# Patient Record
Sex: FEMALE | Race: WHITE | Employment: OTHER | URBAN - METROPOLITAN AREA
[De-identification: names, ages, dates, MRNs, and addresses within clinical notes are randomized per-mention and may not be internally consistent; named-entity substitution may affect disease eponyms.]

---

## 2022-01-06 ENCOUNTER — HOSPITAL ENCOUNTER (OUTPATIENT)
Dept: PHYSICAL THERAPY | Age: 67
Discharge: HOME | End: 2022-01-06
Payer: MEDICARE

## 2022-01-06 PROCEDURE — 97112 NEUROMUSCULAR REEDUCATION: CPT | Performed by: PHYSICAL THERAPIST

## 2022-01-06 PROCEDURE — 97162 PT EVAL MOD COMPLEX 30 MIN: CPT | Performed by: PHYSICAL THERAPIST

## 2022-01-06 PROCEDURE — 97535 SELF CARE MNGMENT TRAINING: CPT | Performed by: PHYSICAL THERAPIST

## 2022-01-06 NOTE — PROGRESS NOTES
Physical Therapy at [facility],   a part of [facility]  [address] [place] [address] Kishor Gill  Phone: [phone]  Fax: [phone]    Plan of Care/Statement of Necessity for Physical Therapy Services  2-15    Patient name: Concepcion Brink  : 1955  Provider#: [id number]  Referral source: Clarissa Nieves MD      Medical/Treatment Diagnosis: Mixed incontinence [N39.46]     Prior Hospitalization: see medical history     Comorbidities: see chart  Prior Level of Function: see chart  Medications: Verified on Patient Summary List  Start of Care: 2022      Onset Date: 3 months   The Plan of Care and following information is based on the information from the initial evaluation. Assessment/ key information: Patient referred to pelvic PT for pelvic floor dysfunction. She presents with signs and symptoms associated with fecal and urge incontinence. She presents with strength deficits, overactivity, poor coordination and awareness of the levator ani. She will benefit from skilled PT to address these problems so that she can perform all ADLs at [facility]. Evaluation Complexity History MEDIUM  Complexity : 1-2 comorbidities / personal factors will impact the outcome/ POC ; Examination MEDIUM Complexity : 3 Standardized tests and measures addressing body structure, function, activity limitation and / or participation in recreation  ;Presentation MEDIUM Complexity : Evolving with changing characteristics  ; Clinical Decision Making MEDIUM Complexity : FOTO score of 26-74  Overall Complexity Rating: MEDIUM    Problem List: pain affecting function, decrease ROM, decrease strength, decrease ADL/ functional abilitiies, decrease activity tolerance and decrease flexibility/ joint mobility   Treatment Plan may include any combination of the following: Therapeutic exercise, Therapeutic activities, Neuromuscular re-education, Physical agent/modality, Manual therapy, Aquatic therapy, Patient education, Self Care training and Functional mobility training  Patient / Family readiness to learn indicated by: asking questions, trying to perform skills and interest  Persons(s) to be included in education: patient (P)  Barriers to Learning/Limitations: None  Patient Goal (s): to reduce FI  Patient Self Reported Health Status: good  Rehabilitation Potential: good    Short Term Goals: To be accomplished in 6 weeks:  Patient will be independent with a progressive home exercise program    Patient will demonstrate & utilized pelvic floor ms protection techniques   Patient will demonstrate compliance with squatty potty technique  Patient will demonstrate compliance with fiber  Patient will demonstrate improved PFM strength to 4/5 bilaterally in order to decrease FI symptoms      Long Term Goals: To be accomplished in 12 weeks:  Patient will demonstrate 5/5 PFM strength bilaterally in order to eliminate FI symptoms. Patient will demonstrate 10 second PFM holds at 5/5 in order to elminate FI symptoms. Patient will be able to maintain normal resting tone of 5mV for complete emptying of bowel   The patient will demonstrate independence with HEP   The patient will demonstrate Negative Hruska Adduction Drop Test    The patient will demonstrate greater than Grade II on Hruska Adduction Lift Test    Frequency / Duration: Patient to be seen 1-2 times per week for 12 weeks. Patient/ Caregiver education and instruction: self care, activity modification and exercises    [x]  Plan of care has been reviewed with PTA        Certification Period: 01/06/2022-04/06/2022  Wendi Heaton, PT 1/6/2022     ________________________________________________________________________    I certify that the above Therapy Services are being furnished while the patient is under my care. I agree with the treatment plan and certify that this therapy is necessary.     Physician's Signature:____________________ Date:____________Time: _________         Brittni Schmitz MD

## 2022-01-06 NOTE — PROGRESS NOTES
PT INITIAL EVALUATION NOTE - [facility] 2-15    Patient Name: Michael Led  Date:2022  : 1955  [x]  Patient  Verified  Payor: /    In time: 245  Out time: 345  Total Treatment Time (min): 60  Total Timed Codes (min): 30  1:1 Treatment Time ( only): 60   Visit #: 1     Treatment Area: Mixed incontinence [N39.46]    SUBJECTIVE  Pain Level (0-10 scale): 0  Any medication changes, allergies to medications, adverse drug reactions, diagnosis change, or new procedure performed?: [] No    [x] Yes (see summary sheet for update)  Subjective:    Patient reports for the last 3 months she has had 3 incidents of FI where she started having leakage without warning and unable to stop. She has a Hx of constipation where 3-4 days was her normal time between BM's. Eating a healthy diet of with plenty of fruits and vegetables. Drinking 5-6 glasses water per day. Reports some mild urinary frequency and feeling of incomplete emptying. No complaints of pelvic pain, mild LBP that is chronic. PLOF: yoga, swimming, line dancing, skiing. Mechanism of Injury: gradual/insidious  Previous Treatment/Compliance: n/a  PMHx/Surgical Hx: see chart  Work Hx: retired   Living Situation: lives with   Pt Goals: to reduce FI  Barriers: none  Motivation: yes  Substance use: none  Cognition: A & O x 4        OBJECTIVE/EXAMINATION    Patient instructed in the role of physical therapy in the evaluation and treatment of pelvic floor dysfunction, applicable treatment modalities discussed. Expectations for regular home exercise participation and expected visit frequency in to achieve the patient's goals were discussed. Patient instructed in pelvic floor anatomy and function including levator ani, puborectalis and superficial pelvic  musculature.      Patient was provided dietary information to improve stool quality including increasing soluble fiber intake (Bran Buds), probiotics (Activia yogurt) and increased water intake (6-8 glasses a day). Pt instructed in use of [place] stool chart to track progress. Patient provided information on bladder diary completion, will collect intake/measured output data for 72 hours and return for analysis. Patient educated in urinary urge suppression techniques including the KNACK, quick flicks, calmly walking rather than rushing to the toilet, nervous system down training. Patient educated in proper defecation posture and technique including use of Squatty Potty for better puborectalis ms relaxation. Pt to avoid straining to pass stool in order to decrease strain on pelvic floor. Postural Restoration [facility]) Evaluation    Posture: Other Observations:             Left   Right  Standing  Standing Reach Test (M)    6 inches     Functional Squat Test  (P)    Level 1       Sidelying  Adduction Drop Test (M)    +   +  Pelvic Mohave Drop Test (PADT) (P)  +   +  Passive Abduction Raise Test (PART) (P)  na   na  Passive IP ER Expansion Test (P)   limited  limited  Hruska Adduction Lift Test (M)   1   1  Hruska Abduction Lift Test (M)   na   na    Supine    Apical Expansion (R)     limited   limited*  Shoulder Flexion (R)       HG IR (R)      45 degrees  45 degrees  Subclavius Flexibility (R)    limited  limited  Elevated and Externally Rotated Ant.  Ribs (R) na   na  Horizontal Abduction (R)    0 degrees  0 degrees  Extension Drop Test (M)    na   na             15 min Neuromuscular Re-education:  [x]  See flow sheet :   Rationale: increase ROM, increase strength, improve coordination and increase proprioception  to improve the patients ability to perform ADLs    15 min Self Care/Home Management:  [x]  See flow sheet :   Rationale: increase ROM, increase strength, improve coordination and increase proprioception  to improve the patients ability to to reduce FI        With   [] TE   [] TA   [] Neuro   [] SC   [] other: Patient Education: [x] Review HEP [] Progressed/Changed HEP based on:   [] positioning   [] body mechanics   [] transfers   [] heat/ice application    [] other:      Other Objective/Functional Measures: [place] [address]           Pain Level (0-10 scale) post treatment: 0    ASSESSMENT/Changes in Function:     [x]  See Plan of [address] [place] [address], PT 1/6/2022

## 2022-01-11 ENCOUNTER — HOSPITAL ENCOUNTER (OUTPATIENT)
Dept: PHYSICAL THERAPY | Age: 67
Discharge: HOME | End: 2022-01-11
Payer: MEDICARE

## 2022-01-11 PROCEDURE — 97535 SELF CARE MNGMENT TRAINING: CPT | Performed by: PHYSICAL THERAPIST

## 2022-01-11 PROCEDURE — 97112 NEUROMUSCULAR REEDUCATION: CPT | Performed by: PHYSICAL THERAPIST

## 2022-01-11 NOTE — PROGRESS NOTES
PT DAILY TREATMENT NOTE - [facility] 2-15    Patient Name: Cruz Ohara  Date:2022  : 1955  [x]  Patient  Verified  [place]or: [place] MED[place] / Plan: [place] MEDICARE PART A & B / Product Type: Medicare /    In time:200  Out time:245  Total Treatment Time (min): 45  Total Timed Codes (min): 45  1:1 Treatment Time ( [address] only): 39   Visit #:  2    Treatment Area: Mixed incontinence [N39.46]    SUBJECTIVE  Pain Level (0-10 scale): 0  Any medication changes, allergies to medications, adverse drug reactions, diagnosis change, or new procedure performed?: [x] No    [] Yes (see summary sheet for update)  Subjective functional status/changes:   [] No changes reported  Patient reports that she has added a small dose of fiber and mg. It has not made a great change yet, her motility is very delayed. Feels like it gets stuck in her upper abdomen. OBJECTIVE      []redness  adverse reaction:          30 min Neuromuscular Re-education:  [x]  See flow sheet :   Rationale: increase ROM, increase strength, improve coordination and increase proprioception  to improve the patients ability to perform ADLs    15 min Self Care/Home Management:  -  See flow sheet :   Rationale: increase ROM, increase strength, improve coordination and increase proprioception  to improve the patients ability to perform ADLs              With   [] TE   [] TA   [] Neuro   [] SC   [] other: Patient Education: [x] Review HEP    [] Progressed/Changed HEP based on:   [] positioning   [] body mechanics   [] transfers   [] heat/ice application    [] other:      Other Objective/Functional Measures: PEC patterning, poor apical and lateral rib excursion with exercises. Will perform EMG assessment next visit.      Pain Level (0-10 scale) post treatment: 0    ASSESSMENT/Changes in Function:     Patient will continue to benefit from skilled PT services to modify and progress therapeutic interventions, address functional mobility deficits, address ROM deficits, address strength deficits, analyze and address soft tissue restrictions, analyze and cue movement patterns, analyze and modify body mechanics/ergonomics and assess and modify postural abnormalities to attain remaining goals.      []  See Plan of Care  []  See progress note/recertification  []  See Discharge Summary         Progress towards goals / Updated goals:  Demonstrates good potential to meet goals at this time    PLAN  []  Upgrade activities as tolerated     [x]  Continue plan of care  []  Update interventions per flow sheet       []  Discharge due to:_  []  Other:_      Clive Falcon, PT 1/11/2022

## 2022-01-18 ENCOUNTER — HOSPITAL ENCOUNTER (OUTPATIENT)
Dept: PHYSICAL THERAPY | Age: 67
Discharge: HOME | End: 2022-01-18
Payer: MEDICARE

## 2022-01-18 PROCEDURE — 97112 NEUROMUSCULAR REEDUCATION: CPT | Performed by: PHYSICAL THERAPIST

## 2022-01-18 PROCEDURE — 97535 SELF CARE MNGMENT TRAINING: CPT | Performed by: PHYSICAL THERAPIST

## 2022-01-18 NOTE — PROGRESS NOTES
PT DAILY TREATMENT NOTE - [facility] 2-15    Patient Name: Mecca Roy  Date:2022  : 1955  [x]  Patient  Verified  Payor: [place] MEDICARE / Plan: [place] MEDICARE PART A & B / Product Type: Medicare /    In time:950  Out time:1030  Total Treatment Time (min): 40  Total Timed Codes (min): 40  1:1 Treatment Time ([facility] only): 40   Visit #:  3    Treatment Area: Mixed incontinence [N39.46]    SUBJECTIVE  Pain Level (0-10 scale): 0  Any medication changes, allergies to medications, adverse drug reactions, diagnosis change, or new procedure performed?: [x] No    [] Yes (see summary sheet for update)  Subjective functional status/changes:   [] No changes reported  Patient reports that she started taking 1000mg of magnesium at night  (previously discussed maintaining her magnesium and increasing fiber), and these past 2 days she has seen increase BM output, urgency, and much softer stools. She is unable to stop herself from leaking when she has an urge, and she is having difficulty distinguishing between gas and stool. OBJECTIVE      []redness  adverse reaction:          30 min Neuromuscular Re-education:  [x]  See flow sheet :   Rationale: increase ROM, increase strength, improve coordination and increase proprioception  to improve the patients ability to perform ADLs    15 min Self Care/Home Management:  -  See flow sheet : Education of fiber/magnesium ratio, toileting schedule, abdominal massage for GI motility   Rationale: increase ROM, increase strength, improve coordination and increase proprioception  to improve the patients ability to perform ADLs              With   [] TE   [] TA   [] Neuro   [] SC   [] other: Patient Education: [x] Review HEP    [] Progressed/Changed HEP based on:   [] positioning   [] body mechanics   [] transfers   [] heat/ice application    [] other:      Other Objective/Functional Measures:   SEMG  Resting:3-5mV  Max work: 12mV for 1 sec.      Pain Level (0-10 scale) post treatment: 0    ASSESSMENT/Changes in Function:   Emphasis placed on importance of managing correct magnesium dosage with increase soluble fiber, as she was taking a high amount not recommended by MD or PT. Patient will continue to benefit from skilled PT services to modify and progress therapeutic interventions, address functional mobility deficits, address ROM deficits, address strength deficits, analyze and address soft tissue restrictions, analyze and cue movement patterns, analyze and modify body mechanics/ergonomics and assess and modify postural abnormalities to attain remaining goals. []  See Plan of Care  []  See progress note/recertification  []  See Discharge Summary         Progress towards goals / Updated goals:  Short Term Goals: To be accomplished in 6 weeks:  Patient will be independent with a progressive home exercise program    Patient will demonstrate & utilized pelvic floor ms protection techniques   Patient will demonstrate compliance with squatty potty technique  Patient will demonstrate compliance with fiber  Patient will demonstrate improved PFM strength to 4/5 bilaterally in order to decrease FI symptoms      Long Term Goals: To be accomplished in 12 weeks:  Patient will demonstrate 5/5 PFM strength bilaterally in order to eliminate FI symptoms.   Patient will demonstrate 10 second PFM holds at 5/5 in order to [address] symptoms.   Patient will be able to maintain normal resting tone of 5mV for complete emptying of bowel   The patient will demonstrate independence with HEP   The patient will demonstrate Negative Hruska Adduction Drop Test    The patient will demonstrate greater than Grade II on Hruska Adduction Lift Test    PLAN  []  Upgrade activities as tolerated     [x]  Continue plan of care  []  Update interventions per flow sheet       []  Discharge due to:_  []  Other:_      Corrinne Fells, PT 1/18/2022

## 2022-01-25 ENCOUNTER — APPOINTMENT (OUTPATIENT)
Dept: PHYSICAL THERAPY | Age: 67
End: 2022-01-25
Payer: MEDICARE

## 2022-02-15 ENCOUNTER — HOSPITAL ENCOUNTER (OUTPATIENT)
Dept: PHYSICAL THERAPY | Age: 67
Discharge: HOME | End: 2022-02-15
Payer: MEDICARE

## 2022-02-15 PROCEDURE — 97535 SELF CARE MNGMENT TRAINING: CPT | Performed by: PHYSICAL THERAPIST

## 2022-02-15 PROCEDURE — 97112 NEUROMUSCULAR REEDUCATION: CPT | Performed by: PHYSICAL THERAPIST

## 2022-02-15 NOTE — PROGRESS NOTES
PT DAILY TREATMENT NOTE - [facility] 2-15    Patient Name: Dionne Alberto  Date:2/15/2022  : 1955  [x]  Patient  Verified  Payor: [place] MEDICARE / Plan: [place] MEDICARE PART A & B / Product Type: Medicare /    In time:1030  Out time:1115  Total Treatment Time (min): 45  Total Timed Codes (min): 45  1:1 Treatment Time ( [address] only): 39   Visit #:  4    Treatment Area: Mixed incontinence [N39.46]    SUBJECTIVE  Pain Level (0-10 scale): 0  Any medication changes, allergies to medications, adverse drug reactions, diagnosis change, or new procedure performed?: [x] No    [] Yes (see summary sheet for update)  Subjective functional status/changes:   [] No changes reported  Patient reports that she has been sick and went on a ski trip over past month. Only had one incident of UI where she couldn't get her ski clothes off fast enough. BM have improved with magnesium and fiber balance. Not sure if she is doing the exercises correctly. OBJECTIVE      []redness  adverse reaction:          30 min Neuromuscular Re-education:  [x]  See flow sheet :   Rationale: increase ROM, increase strength, improve coordination and increase proprioception  to improve the patients ability to perform ADLs    15 min Self Care/Home Management:  -  See flow sheet : Education of fiber/magnesium ratio, toileting schedule, abdominal massage for GI motility--Reveiwed   Rationale: increase ROM, increase strength, improve coordination and increase proprioception  to improve the patients ability to perform ADLs              With   [] TE   [] TA   [] Neuro   [] SC   [] other: Patient Education: [x] Review HEP    [] Progressed/Changed HEP based on:   [] positioning   [] body mechanics   [] transfers   [] heat/ice application    [] other:      Other Objective/Functional Measures:   Requires verbal cues for correct performance of exercises. Progressed to sitting and standing    22  SEMG  Resting:3-5mV  Max work: 12mV for 1 sec.      Pain Level (0-10 scale) post treatment: 0    ASSESSMENT/Changes in Function:   Emphasis placed on importance of managing correct magnesium dosage with increase soluble fiber, as she was taking a high amount not recommended by MD or PT. Patient will continue to benefit from skilled PT services to modify and progress therapeutic interventions, address functional mobility deficits, address ROM deficits, address strength deficits, analyze and address soft tissue restrictions, analyze and cue movement patterns, analyze and modify body mechanics/ergonomics and assess and modify postural abnormalities to attain remaining goals. []  See Plan of Care  []  See progress note/recertification  []  See Discharge Summary         Progress towards goals / Updated goals: Making steady progress towards all ST and LT goals  Short Term Goals: To be accomplished in 6 weeks:  Patient will be independent with a progressive home exercise program    Patient will demonstrate & utilized pelvic floor ms protection techniques   Patient will demonstrate compliance with squatty potty technique  Patient will demonstrate compliance with fiber  Patient will demonstrate improved PFM strength to 4/5 bilaterally in order to decrease FI symptoms      Long Term Goals: To be accomplished in 12 weeks:  Patient will demonstrate 5/5 PFM strength bilaterally in order to eliminate FI symptoms.   Patient will demonstrate 10 second PFM holds at 5/5 in order to [address] symptoms.   Patient will be able to maintain normal resting tone of 5mV for complete emptying of bowel   The patient will demonstrate independence with HEP   The patient will demonstrate Negative Hruska Adduction Drop Test    The patient will demonstrate greater than Grade II on Hruska Adduction Lift Test    PLAN  []  Upgrade activities as tolerated     [x]  Continue plan of care  []  Update interventions per flow sheet       []  Discharge due to:_  []  Other:_      Karrie Felder, PT 2/15/2022

## 2022-02-15 NOTE — PROGRESS NOTES
Physical Therapy at [facility],   a part of  [facility]  [address] [facility] Kishor Gill  Phone: [phone]      Fax:  [phone]    Progress Note    Name: Michelle Murray   : 1955   MD: Brandt Morales MD       Treatment Diagnosis: Mixed incontinence [N39.46]  Start of Care: 2022    Visits from Start of Care: 4  Missed Visits: 0    Summary of Care:Patient is making steady progress towards all UI and FI goals as noted by decreased episodes and pad wearing. Will continue to benefit from skilled PT to achieve remaining. Progress towards goals / Updated goals: Making steady progress towards all ST and LT goals  Short Term Goals: To be accomplished in 6 weeks:  Patient will be independent with a progressive home exercise program    Patient will demonstrate & utilized pelvic floor ms protection techniques   Patient will demonstrate compliance with squatty potty technique  Patient will demonstrate compliance with fiber  Patient will demonstrate improved PFM strength to 4/5 bilaterally in order to decrease FI symptoms      Long Term Goals: To be accomplished in 12 weeks:  Patient will demonstrate 5/5 PFM strength bilaterally in order to eliminate FI symptoms.   Patient will demonstrate 10 second PFM holds at 5/5 in order to [address] symptoms.   Patient will be able to maintain normal resting tone of 5mV for complete emptying of bowel   The patient will demonstrate independence with HEP   The patient will demonstrate Negative Hruska Adduction Drop Test    The patient will demonstrate greater than Grade II on Hruska Adduction Lift Test        Wendi Heaton, PT 2/15/2022

## 2022-03-08 ENCOUNTER — APPOINTMENT (OUTPATIENT)
Dept: PHYSICAL THERAPY | Age: 67
End: 2022-03-08
Payer: MEDICARE

## 2022-03-22 ENCOUNTER — HOSPITAL ENCOUNTER (OUTPATIENT)
Dept: PHYSICAL THERAPY | Age: 67
Discharge: HOME | End: 2022-03-22
Payer: MEDICARE

## 2022-03-22 PROCEDURE — 97112 NEUROMUSCULAR REEDUCATION: CPT | Performed by: PHYSICAL THERAPIST

## 2022-03-22 PROCEDURE — 97535 SELF CARE MNGMENT TRAINING: CPT | Performed by: PHYSICAL THERAPIST

## 2022-03-22 NOTE — PROGRESS NOTES
PT DAILY TREATMENT NOTE - [facility] 2-15    Patient Name: Glenny Martinez  Date:3/22/2022  : 1955  [x]  Patient  Verified  [place]or: [place] MEDICARE / Plan: [place] MEDICARE PART A & B / Product Type: Medicare /    In time:1030  Out time:1110  Total Treatment Time (min): 40  Total Timed Codes (min): 40  1:1 Treatment Time ( only): 40   Visit #:  5    Treatment Area: Mixed incontinence [N39.46]    SUBJECTIVE  Pain Level (0-10 scale): 0  Any medication changes, allergies to medications, adverse drug reactions, diagnosis change, or new procedure performed?: [x] No    [] Yes (see summary sheet for update)  Subjective functional status/changes:   [] No changes reported  Patient reports that she went to [facility] a couple weeks ago, not as diligent with her exercises. Experienced mild leakage while on vacation, noted streaking at the end of the day. Would like to review her HEP. OBJECTIVE      []redness  adverse reaction:          30 min Neuromuscular Re-education:  [x]  See flow sheet :   Rationale: increase ROM, increase strength, improve coordination and increase proprioception  to improve the patients ability to perform ADLs    10 min Self Care/Home Management:  -  See flow sheet : pelvic brace and [employer]   Rationale: increase ROM, increase strength, improve coordination and increase proprioception  to improve the patients ability to perform ADLs              With   [] TE   [] TA   [] Neuro   [] SC   [] other: Patient Education: [x] Review HEP    [] Progressed/Changed HEP based on:   [] positioning   [] body mechanics   [] transfers   [] heat/ice application    [] other:      Other Objective/Functional Measures:   Patient required some manual and Verbal FB to perform exercises correctly    22 (recheck in 4 weeks)  SEMG  Resting:3-5mV  Max work: 12mV for 1 sec.      Pain Level (0-10 scale) post treatment: 0    ASSESSMENT/Changes in Function:   Emphasis placed on importance of managing correct magnesium dosage with increase soluble fiber, as she was taking a high amount not recommended by MD or PT. Patient will continue to benefit from skilled PT services to modify and progress therapeutic interventions, address functional mobility deficits, address ROM deficits, address strength deficits, analyze and address soft tissue restrictions, analyze and cue movement patterns, analyze and modify body mechanics/ergonomics and assess and modify postural abnormalities to attain remaining goals. []  See Plan of Care  []  See progress note/recertification  []  See Discharge Summary         Progress towards goals / Updated goals: Continues to make slow but steady progress,  Limited by compliance with HEP due to traveling. Will recheck sEMG next visit. Short Term Goals: To be accomplished in 6 weeks:  Patient will be independent with a progressive home exercise program    Patient will demonstrate & utilized pelvic floor ms protection techniques   Patient will demonstrate compliance with squatty potty technique  Patient will demonstrate compliance with fiber  Patient will demonstrate improved PFM strength to 4/5 bilaterally in order to decrease FI symptoms      Long Term Goals: To be accomplished in 12 weeks:  Patient will demonstrate 5/5 PFM strength bilaterally in order to eliminate FI symptoms.   Patient will demonstrate 10 second PFM holds at 5/5 in order to [address] symptoms.   Patient will be able to maintain normal resting tone of 5mV for complete emptying of bowel   The patient will demonstrate independence with HEP   The patient will demonstrate Negative Hruska Adduction Drop Test    The patient will demonstrate greater than Grade II on Hruska Adduction Lift Test    PLAN  []  Upgrade activities as tolerated     [x]  Continue plan of care  []  Update interventions per flow sheet       []  Discharge due to:_  []  Other:_      Gely Ugarte, PT 3/22/2022

## 2022-03-22 NOTE — PROGRESS NOTES
Physical Therapy at [facility],   a part of [address]  [place]  [facility], [address]  Phone: [phone]      Fax:  [phone]    Progress Note    Name: Yana Matthews   : 1955   MD: Deja Wallace MD       Treatment Diagnosis: Mixed incontinence [N39.46]  Start of Care: 2022    Visits from Start of Care: 5  Missed Visits: 2    Summary of Care:Patient is making steady but slow progress towards all goals, secondary to travel and illness. She will continue to benefit from skilled PT to progress her HEP and continue to address strength deficits so that she can achieve remaining PT goals    Assessment / Recommendations:   Progress towards goals / Updated goals: Continues to make slow but steady progress,  Limited by compliance with HEP due to traveling. Will recheck sEMG in next 4-6 weeks. Short Term Goals: To be accomplished in 6 weeks: 50% MET  Patient will be independent with a progressive home exercise program    Patient will demonstrate & utilized pelvic floor ms protection techniques   Patient will demonstrate compliance with squatty potty technique  Patient will demonstrate compliance with fiber  Patient will demonstrate improved PFM strength to 4/5 bilaterally in order to decrease FI symptoms      Long Term Goals: To be accomplished in 12 weeks:  Patient will demonstrate 5/5 PFM strength bilaterally in order to eliminate FI symptoms.   Patient will demonstrate 10 second PFM holds at 5/5 in order to [address] symptoms.   Patient will be able to maintain normal resting tone of 5mV for complete emptying of bowel   The patient will demonstrate independence with HEP   The patient will demonstrate Negative Hruska Adduction Drop Test    The patient will demonstrate greater than Grade II on Hruska Adduction Lift Test            Leny Coburn, PT 3/22/2022

## 2022-04-26 ENCOUNTER — HOSPITAL ENCOUNTER (OUTPATIENT)
Dept: PHYSICAL THERAPY | Age: 67
Discharge: HOME | End: 2022-04-26
Payer: MEDICARE

## 2022-04-26 PROCEDURE — 97535 SELF CARE MNGMENT TRAINING: CPT | Performed by: PHYSICAL THERAPIST

## 2022-04-26 PROCEDURE — 97112 NEUROMUSCULAR REEDUCATION: CPT | Performed by: PHYSICAL THERAPIST

## 2022-04-26 NOTE — PROGRESS NOTES
PT DAILY TREATMENT NOTE - [facility] 2-15    Patient Name: Jethro Faria  Date:2022  : 1955  [x]  Patient  Verified  Payor: VA MEDICARE / Plan: VA MEDICARE PART A & B / Product Type: Medicare /    In time:210  Out time:250  Total Treatment Time (min): 40  Total Timed Codes (min): 40  1:1 Treatment Time ( [address] only): 40   Visit #:  6    Treatment Area: Mixed incontinence [N39.46]    SUBJECTIVE  Pain Level (0-10 scale): 0  Any medication changes, allergies to medications, adverse drug reactions, diagnosis change, or new procedure performed?: [x] No    [] Yes (see summary sheet for update)  Subjective functional status/changes:   [] No changes reported  Concerned that her stool is now a Type 1 on [place] stool. She has reduced her water intake and doubled soluble fiber intake. Taking 800mg of Magnesium at night without improvement. Seeing improvement with urge suppression, ability to hold without leakage. OBJECTIVE      []redness - adverse reaction:          30 min Neuromuscular Re-education:  [x]  See flow sheet :   Rationale: increase ROM, increase strength, improve coordination and increase proprioception  to improve the patients ability to perform ADLs    10 min Self Care/Home Management:  -  See flow sheet : fiber, water management, toileting schedules and postures   Rationale: increase ROM, increase strength, improve coordination and increase proprioception  to improve the patients ability to perform ADLs              With   [] TE   [] TA   [] Neuro   [] SC   [] other: Patient Education: [x] Review HEP    [] Progressed/Changed HEP based on:   [] positioning   [] body mechanics   [] transfers   [] heat/ice application    [] other:      Other Objective/Functional Measures:     2022  SEMG  Resting:3-5mV  Max work: 20mV for 1 sec.  (improved)    [place] improved recruitment of PFM with breath coordination -no overflow effect    Pain Level (0-10 scale) post treatment: 0    ASSESSMENT/Changes in Function:   Emphasis placed on importance of managing correct magnesium dosage with increase soluble fiber, as she was taking a high amount not recommended by MD or PT. Patient will continue to benefit from skilled PT services to modify and progress therapeutic interventions, address functional mobility deficits, address ROM deficits, address strength deficits, analyze and address soft tissue restrictions, analyze and cue movement patterns, analyze and modify body mechanics/ergonomics and assess and modify postural abnormalities to attain remaining goals. []  See Plan of Care  []  See progress note/recertification  []  See Discharge Summary         Progress towards goals / Updated goals: 75% met, good candidate for D/C to HEP at this time per patient request  Short Term Goals: To be accomplished in 6 weeks:  Patient will be independent with a progressive home exercise program    Patient will demonstrate & utilized pelvic floor ms protection techniques   Patient will demonstrate compliance with squatty potty technique  Patient will demonstrate compliance with fiber  Patient will demonstrate improved PFM strength to 4/5 bilaterally in order to decrease FI symptoms      Long Term Goals: To be accomplished in 12 weeks:  Patient will demonstrate 5/5 PFM strength bilaterally in order to eliminate FI symptoms.   Patient will demonstrate 10 second PFM holds at 5/5 in order to [address] symptoms.   Patient will be able to maintain normal resting tone of 5mV for complete emptying of bowel   The patient will demonstrate independence with HEP   The patient will demonstrate Negative Hruska Adduction Drop Test    The patient will demonstrate greater than Grade II on Hruska Adduction Lift Test    PLAN  []  Upgrade activities as tolerated     [x]  Continue plan of care  []  Update interventions per flow sheet       []  Discharge due to:_  []  Other:_      Odin Stahl, PT 4/26/2022

## 2022-08-30 ENCOUNTER — OFFICE VISIT (OUTPATIENT)
Dept: OBGYN CLINIC | Age: 67
End: 2022-08-30
Payer: MEDICARE

## 2022-08-30 VITALS — SYSTOLIC BLOOD PRESSURE: 137 MMHG | DIASTOLIC BLOOD PRESSURE: 81 MMHG | WEIGHT: 161.6 LBS

## 2022-08-30 DIAGNOSIS — Z01.419 WELL WOMAN EXAM WITH ROUTINE GYNECOLOGICAL EXAM: Primary | ICD-10-CM

## 2022-08-30 PROCEDURE — G8432 DEP SCR NOT DOC, RNG: HCPCS | Performed by: OBSTETRICS & GYNECOLOGY

## 2022-08-30 PROCEDURE — 1090F PRES/ABSN URINE INCON ASSESS: CPT | Performed by: OBSTETRICS & GYNECOLOGY

## 2022-08-30 PROCEDURE — 3017F COLORECTAL CA SCREEN DOC REV: CPT | Performed by: OBSTETRICS & GYNECOLOGY

## 2022-08-30 PROCEDURE — 1101F PT FALLS ASSESS-DOCD LE1/YR: CPT | Performed by: OBSTETRICS & GYNECOLOGY

## 2022-08-30 PROCEDURE — G0101 CA SCREEN;PELVIC/BREAST EXAM: HCPCS | Performed by: OBSTETRICS & GYNECOLOGY

## 2022-08-30 PROCEDURE — G8421 BMI NOT CALCULATED: HCPCS | Performed by: OBSTETRICS & GYNECOLOGY

## 2022-08-30 RX ORDER — PROGESTERONE 100 MG/1
100 CAPSULE ORAL DAILY
COMMUNITY
Start: 2022-07-29

## 2022-08-30 RX ORDER — ESTRADIOL 2 MG/1
TABLET ORAL
COMMUNITY
Start: 2022-07-29

## 2022-08-30 RX ORDER — IBANDRONATE SODIUM 150 MG/1
TABLET, FILM COATED ORAL
COMMUNITY
Start: 2018-04-14

## 2022-08-30 RX ORDER — ESTRADIOL AND NORETHINDRONE ACETATE 1; .5 MG/1; MG/1
1 TABLET ORAL DAILY
Qty: 90 TABLET | Refills: 4 | Status: SHIPPED | OUTPATIENT
Start: 2022-08-30

## 2022-08-30 NOTE — PROGRESS NOTES
Annual exam ages 69+    Sherice Goodman is a No obstetric history on file. ,  78 y.o. female   No LMP recorded. (Menstrual status: Menopause). She presents for her annual checkup. She is having  no sex drive . Menstrual status:    Her periods are absent due to menopause. Contraception:    The current method of family planning is NA post menopause. Hormonal status:    She is not having vasomotor symptoms. The patient is not using any ERT. Sexual history:    She  has no history on file for sexual activity. Medical conditions:    Since her last annual GYN exam about  few years   ago, she has not the following changes in her health history: none. Pap and Mammogram History:    Her most recent Pap smear was normal obtained 3 year(s) ago. Per patient     The patient had a recent mammogram 11/24/22 which was negative for malignancy. Per patient    Breast Cancer History/Substance Abuse: negative    Osteoporosis History:    Family history does not include a first or second degree relative with osteopenia or osteoporosis. History reviewed. No pertinent past medical history. History reviewed. No pertinent surgical history. Current Outpatient Medications   Medication Sig Dispense Refill    estradioL (ESTRACE) 2 mg tablet TAKE 1 TABLET BY MOUTH EVERY DAY FOR 90 DAYS      ibandronate (BONIVA) 150 mg tablet 1 tablet      progesterone (PROMETRIUM) 100 mg capsule Take 100 mg by mouth daily. TESTOSTERONE IM by IntraMUSCular route. Allergies: [place] flavor     Tobacco History:  reports that she has never smoked. She has never used smokeless tobacco.  Alcohol Abuse:  has no history on file for alcohol use. Drug Abuse:  has no history on file for drug use. Family Medical/Cancer History: History reviewed. No pertinent family history.      Review of Systems - History obtained from the patient  Constitutional: negative for weight loss, fever, night sweats  HEENT: negative for hearing loss, earache, congestion, snoring, sorethroat  CV: negative for chest pain, palpitations, edema  Resp: negative for cough, shortness of breath, wheezing  GI: negative for change in bowel habits, abdominal pain, black or bloody stools  : negative for frequency, dysuria, hematuria, vaginal discharge  MSK: negative for back pain, joint pain, muscle pain  Breast: negative for breast lumps, nipple discharge, galactorrhea  Skin :negative for itching, rash, hives  Neuro: negative for dizziness, headache, confusion, weakness  Psych: negative for anxiety, depression, change in mood  Heme/lymph: negative for bleeding, bruising, pallor    Physical Exam    Visit Vitals  /81   Wt 161 lb 9.6 oz (73.3 kg)       Constitutional  Appearance: well-nourished, well developed, alert, in no acute distress    HENT  Head and Face: appears normal    Neck  Inspection/Palpation: normal appearance, no masses or tenderness  Lymph Nodes: no lymphadenopathy present  Thyroid: gland size normal, nontender, no nodules or masses present on palpation    Chest  Respiratory Effort: breathing unlabored    Breasts  Inspection of Breasts: breasts symmetrical, no skin changes, no discharge present, nipple appearance normal, no skin retraction present  Palpation of Breasts and Axillae: no masses present on palpation, no breast tenderness  Axillary Lymph Nodes: no lymphadenopathy present    Gastrointestinal  Abdominal Examination: abdomen non-tender to palpation, normal bowel sounds, no masses present  Liver and spleen: no hepatomegaly present, spleen not palpable  Hernias: no hernias identified    Genitourinary  External Genitalia: normal appearance for age, no discharge present, no tenderness present, no inflammatory lesions present, no masses present, atrophy present  Vagina: atrophic but otherwise normal vaginal vault without central or paravaginal defects, no discharge present, no inflammatory lesions present, no masses present  Bladder: non-tender to palpation  Urethra: appears normal  Cervix: normal   Uterus: normal size, shape and consistency  Adnexa: no adnexal tenderness present, no adnexal masses present  Perineum: perineum within normal limits, no evidence of trauma, no rashes or skin lesions present  Anus: anus within normal limits, no hemorrhoids present  Inguinal Lymph Nodes: no lymphadenopathy present    Skin  General Inspection: no rash, no lesions identified    Neurologic/Psychiatric  Mental Status:  Orientation: grossly oriented to person, place and time  Mood and Affect: mood normal, affect appropriate    Assessment:  Routine gynecologic examination  Her current medical status is satisfactory with no evidence of significant gynecologic issues. Decreased sensation with intercourse- offered referral to Sex therapist, pt declined, using testosterone cream that she reports is not helping. Discussed risks associated. Discussed r/b/a of hrt, pt desires to continue. Stenotic cervix- will get records, if normal pap then no longer needs to continue.     Plan:  Counseled re: diet, exercise, healthy lifestyle  Return for yearly wellness visits  Rec annual mammogram

## 2022-09-03 LAB
CYTOLOGIST CVX/VAG CYTO: NORMAL
CYTOLOGY CVX/VAG DOC CYTO: NORMAL
CYTOLOGY CVX/VAG DOC THIN PREP: NORMAL
CYTOLOGY HISTORY:: NORMAL
DX ICD CODE: NORMAL
LABCORP, 190119: NORMAL
Lab: NORMAL
OTHER STN SPEC: NORMAL
PATHOLOGIST CVX/VAG CYTO: NORMAL
STAT OF ADQ CVX/VAG CYTO-IMP: NORMAL

## 2022-09-06 ENCOUNTER — TELEPHONE (OUTPATIENT)
Dept: OBGYN CLINIC | Age: 67
End: 2022-09-06

## 2022-09-06 NOTE — TELEPHONE ENCOUNTER
Zeb Levi MD  You 4 days ago     FW  Please call her pharmacy and refill what they have on file for testosterone. I believe she is using testosterone cream 2%, apply a small amount topically, once daily. #3 month supply, refills 4. Janet Louis  to Zeb Levi MD        9:08 AM  I forgot to give you the pharmacy for the testosterone. [address] [place] [address]  [phone]  I Read your summary. I do not have lack of desire. Its lack of sensation.       Please confirm this is the correct dosage and grams and then I will call in    Thank you    Please amend=/sign

## 2022-09-07 NOTE — TELEPHONE ENCOUNTER
Prescription called in as per MD order to [address]     Patient was sent a my chart of prescription refill sent

## 2022-11-16 ENCOUNTER — TRANSCRIBE ORDER (OUTPATIENT)
Dept: GENERAL RADIOLOGY | Age: 67
End: 2022-11-16

## 2022-11-16 ENCOUNTER — HOSPITAL ENCOUNTER (OUTPATIENT)
Dept: GENERAL RADIOLOGY | Age: 67
Discharge: HOME | End: 2022-11-16
Payer: MEDICARE

## 2022-11-16 DIAGNOSIS — M79.671 RIGHT FOOT PAIN: ICD-10-CM

## 2022-11-16 DIAGNOSIS — M79.671 RIGHT FOOT PAIN: Primary | ICD-10-CM

## 2022-11-16 PROCEDURE — 73630 X-RAY EXAM OF FOOT: CPT

## 2022-11-18 NOTE — PROGRESS NOTES
Physical Therapy at [facility],   a part of  [facility]  [address] [facility], [address]  Phone: [phone]  Fax: [phone]    Discharge Summary  2-15    Patient name: Luis Alberto Herbert  : 1955  Provider#: [id number]  Referral source: Janeann Ahumada, MD      Medical/Treatment Diagnosis: Mixed incontinence [N39.46]     Prior Hospitalization: see medical history     Comorbidities: See Plan of Care  Prior Level of Function:See Plan of Care  Medications: Verified on Patient Summary List    Start of Care: 2022      Onset Date:3 months   Visits from Start of Care: 6     Missed Visits: 0  Reporting Period :  to 2022      ASSESSMENT/SUMMARY OF CARE: Making progress towards all goals.   Provided with HEP for continues Sx management          RECOMMENDATIONS:  [x]Discontinue therapy: [x]Patient has reached or is progressing toward set goals      []Patient is non-compliant or has abdicated      []Due to lack of appreciable progress towards set goals      []Other    Chase Owen, PT 2022

## 2023-03-30 ENCOUNTER — TELEPHONE (OUTPATIENT)
Dept: OBGYN CLINIC | Age: 68
End: 2023-03-30

## 2023-03-31 NOTE — TELEPHONE ENCOUNTER
Prescription called in as per MD order for testosterone propionate (TESTOSTERONE CREAM) [[id number]]     Order Details  Dose, Route, Frequency: As Directed   Dispense Quantity: 30 g Refills: 4          Sig: Testosterone Cream 2%  apply a small amount topically, once daily. Start Date: 03/31/23 End Date: --   Written Date: 03/31/23 Expiration Date: --   Original Order:  testosterone propionate (TESTOSTERONE CREAM) [[id number]]   Providers    Authorizing Provider:    Rachael Jenkins MD   [address] [place] [address]   Phone:  [phone]   Fax:  [phone]   [address] [place] [address] #:  [id number]   NPI:  [id number]         Pharmacy verbalized understanding.     Patient was sent a my chart message

## 2023-03-31 NOTE — TELEPHONE ENCOUNTER
75year old patient last seen in the office on 8/30/2022 for ae and is being seen on 9/6/2023 for next ae    Please amend/sign requested prescription      Ok to call in the refill  Please advise    Thank you          Indy Ch[facility]ell [facility] (supporting Marisa Sheets MD) 14 hours ago (5:28 PM)     DB  The pharmacy says the prescription ended 3/6/2023.        You  Roseanna Valle 15 hours ago (4:24 PM)     BS  Hello     Please check with the pharmacy as you should have refills from the prescription sent in September 2022

## 2023-03-31 NOTE — TELEPHONE ENCOUNTER
Two patient identifiers used      [facility] [address] calling for the refill of the pended medication ( the medication is a controlled substance and the prescription has  and needs renewed        ? Ok to call in till next ae due, please amend/sign so it can be called in    Please advise    Patient has next ae on 2023

## 2023-03-31 NOTE — TELEPHONE ENCOUNTER
She was given refills to last for a year, if she has already gone through all of the refills then she is likely using too much. Further, over 65 the benefits usually do not outweigh the risks. Also at her last appt she said she did not notice that it was helping anyway, so she should think about weaning off. I will refill for another year but she should try to wean herself off as I likely will not refill it again due to the risks.       (To wean off she can use every other day then every third day, etc)

## 2023-09-05 NOTE — PROGRESS NOTES
Nick Dickson is a 75 y.o. female returns for an annual exam     Chief Complaint   Patient presents with    Annual Exam       No LMP recorded. Patient is postmenopausal.  Her periods are absent. Problems: no problems  Birth Control: post menopausal status. Last Pap: normal NO ECC obtained 1 year(s) ago. She does not have a history of ROMI 2, 3 or cervical cancer. Last Mammogram: had a recent mammogram 2/2023 which was negative for malignancy. Last Bone Density: normal obtained 1 year(s) ago. Last colonoscopy: normal obtained 3 year(s) ago. 1. Have you been to the ER, urgent care clinic, or hospitalized since your last visit? No    2. Have you seen or consulted any other health care providers outside of the [address] [place] [address] Avenue since your last visit? No    Examination chaperoned by Gucci Kovacs CMA.

## 2023-09-06 ENCOUNTER — OFFICE VISIT (OUTPATIENT)
Age: 68
End: 2023-09-06
Payer: MEDICARE

## 2023-09-06 VITALS — DIASTOLIC BLOOD PRESSURE: 77 MMHG | SYSTOLIC BLOOD PRESSURE: 151 MMHG | WEIGHT: 161 LBS

## 2023-09-06 DIAGNOSIS — Z01.419 ENCOUNTER FOR GYNECOLOGICAL EXAMINATION (GENERAL) (ROUTINE) WITHOUT ABNORMAL FINDINGS: Primary | ICD-10-CM

## 2023-09-06 PROCEDURE — G0101 CA SCREEN;PELVIC/BREAST EXAM: HCPCS | Performed by: OBSTETRICS & GYNECOLOGY

## 2023-09-06 RX ORDER — ESTRADIOL AND NORETHINDRONE ACETATE 1; .5 MG/1; MG/1
1 TABLET ORAL DAILY
Qty: 90 TABLET | Refills: 4 | Status: SHIPPED | OUTPATIENT
Start: 2023-09-06

## 2023-09-06 NOTE — PROGRESS NOTES
Annual exam    Sarwat Mancuso is a No obstetric history on file. ,  75 y.o. female   No LMP recorded. Patient is postmenopausal.    She presents for her annual checkup. She has no significant complaints     Hormonal status:  She reports no perimenstrual type symptoms. She is not having vasomotor symptoms. The patient is not using any ERT. Sexual history:    She  has no history on file for sexual activity. Per Nursing Note:   No LMP recorded. Patient is postmenopausal.  Her periods are absent. Problems: no problems  Birth Control: post menopausal status. Last Pap: normal NO ECC obtained 1 year(s) ago. She does not have a history of ROMI 2, 3 or cervical cancer. Last Mammogram: had a recent mammogram 2/2023 which was negative for malignancy. Last Bone Density: normal obtained 1 year(s) ago. Last colonoscopy: normal obtained 3 year(s) ago. No past medical history on file. No past surgical history on file. Current Outpatient Medications   Medication Sig Dispense Refill    TESTOSTERONE NA Place vaginally Pt states she uses a compounding testosterone cream      estradiol-norethindrone (ACTIVELLA) 1-0.5 MG per tablet Take 1 tablet by mouth daily      ibandronate (BONIVA) 150 MG tablet 1 tablet      estradiol (ESTRACE) 2 MG tablet TAKE 1 TABLET BY MOUTH EVERY DAY FOR 90 DAYS (Patient not taking: Reported on 9/6/2023)      progesterone (PROMETRIUM) 100 MG CAPS capsule Take 100 mg by mouth daily (Patient not taking: Reported on 9/6/2023)       No current facility-administered medications for this visit. Allergies: [place] flavor     Tobacco History:  reports that she has never smoked. She has never used smokeless tobacco.  Alcohol Abuse:  has no history on file for alcohol use. Drug Abuse:  has no history on file for drug use.     Family Medical/Cancer History:   Family History   Problem Relation Age of Onset    Breast Cancer Maternal Aunt         Review of Systems - History obtained from the

## 2023-09-25 RX ORDER — ESTRADIOL AND NORETHINDRONE ACETATE 1; .5 MG/1; MG/1
1 TABLET ORAL DAILY
Qty: 84 TABLET | OUTPATIENT
Start: 2023-09-25

## 2023-10-17 RX ORDER — ESTRADIOL AND NORETHINDRONE ACETATE 1; .5 MG/1; MG/1
1 TABLET ORAL DAILY
Qty: 84 TABLET | OUTPATIENT
Start: 2023-10-17

## 2023-10-20 ENCOUNTER — TELEPHONE (OUTPATIENT)
Age: 68
End: 2023-10-20

## 2023-10-20 RX ORDER — ESTRADIOL AND NORETHINDRONE ACETATE 1; .5 MG/1; MG/1
1 TABLET ORAL DAILY
Qty: 90 TABLET | Refills: 4 | Status: SHIPPED | OUTPATIENT
Start: 2023-10-20

## 2023-10-20 NOTE — TELEPHONE ENCOUNTER
Two patient identifiers used    70year old patient last seen in the office on 9/6/2023 for ae    Patient calling to say that she has not gotten her refill of her  estradiol-norethindrone (ACTIVELLA) 1-0.5 MG per tablet [[id number]]     Order Details  Dose: 1 tablet Route: Oral Frequency: DAILY   Dispense Quantity: 90 tablet         This nurse advised that the prescription has been sent to [facility]      Prescription resent as per MD verbal order to patient requested pharmacy    Patient verbalized understanding.

## 2024-01-03 ENCOUNTER — TELEPHONE (OUTPATIENT)
Age: 69
End: 2024-01-03

## 2024-01-03 DIAGNOSIS — N95.1 POST MENOPAUSAL SYNDROME: Primary | ICD-10-CM

## 2024-01-03 NOTE — TELEPHONE ENCOUNTER
Pt calling last seen for AE on 9/6/23 requesting refills for her testosterone cream she applies to her arm be sent to [facility] pharmacy. Please advise. Pt is aware you are out of the office.

## 2024-01-04 NOTE — TELEPHONE ENCOUNTER
Pt called and requested for refill of testosterone Request was sent to  yesterday and pt notified that  is out of the office this week. Pt stated that  next week  will be too late because she is traveling on Sunday.  Please review.

## 2024-01-04 NOTE — TELEPHONE ENCOUNTER
[facility] called, PT name and  verified. Pharmacist reviewed the refill she had before, verbally reviewed MD message.  Ann Amezquita MD   to Narciso Baptiste, CMA       24  3:05 PM  Ok to refill  Testosterone 2% cream apply a small amount to skin once daily.  30 grams refills 5.     Pharmacist clarifies and reads back order, SHARITA # given, and states they will get that ordered for PT.

## 2024-01-04 NOTE — TELEPHONE ENCOUNTER
PT call returned name and      Relayed rx was called into pharmacy per MD order, and relayed MD message:  Ann Amezquita MD   to Narciso Baptiste, NIKKO       24  3:05 PM  Ok to refill  Testosterone 2% cream apply a small amount to skin once daily.  30 grams refills 5.      Please remind her that, as we have discussed at previous visits, I recommend she stop the testosterone due to the risks especially over 66yo.        PT states she hears the recommendation, but she is not ready to stop the medication.

## 2024-05-20 SDOH — HEALTH STABILITY: PHYSICAL HEALTH: ON AVERAGE, HOW MANY DAYS PER WEEK DO YOU ENGAGE IN MODERATE TO STRENUOUS EXERCISE (LIKE A BRISK WALK)?: 5 DAYS

## 2024-05-20 SDOH — HEALTH STABILITY: PHYSICAL HEALTH: ON AVERAGE, HOW MANY MINUTES DO YOU ENGAGE IN EXERCISE AT THIS LEVEL?: 60 MIN

## 2024-05-23 ENCOUNTER — OFFICE VISIT (OUTPATIENT)
Age: 69
End: 2024-05-23

## 2024-05-23 VITALS — WEIGHT: 154 LBS | HEIGHT: 63 IN | BODY MASS INDEX: 27.29 KG/M2

## 2024-05-23 DIAGNOSIS — G89.29 CHRONIC PAIN OF LEFT KNEE: Primary | ICD-10-CM

## 2024-05-23 DIAGNOSIS — M17.12 PRIMARY OSTEOARTHRITIS OF LEFT KNEE: ICD-10-CM

## 2024-05-23 DIAGNOSIS — M25.562 CHRONIC PAIN OF LEFT KNEE: Primary | ICD-10-CM

## 2024-08-19 ENCOUNTER — HOSPITAL ENCOUNTER (OUTPATIENT)
Facility: HOSPITAL | Age: 69
Discharge: HOME | End: 2024-08-22
Payer: MEDICARE

## 2024-08-19 DIAGNOSIS — M17.12 PRIMARY OSTEOARTHRITIS OF LEFT KNEE: ICD-10-CM

## 2024-08-19 DIAGNOSIS — G89.29 CHRONIC PAIN OF LEFT KNEE: ICD-10-CM

## 2024-08-19 DIAGNOSIS — M25.562 CHRONIC PAIN OF LEFT KNEE: ICD-10-CM

## 2024-08-19 LAB
EKG ATRIAL RATE: 74 BPM
EKG DIAGNOSIS: NORMAL
EKG P AXIS: 49 DEGREES
EKG P-R INTERVAL: 132 MS
EKG Q-T INTERVAL: 382 MS
EKG QRS DURATION: 80 MS
EKG QTC CALCULATION (BAZETT): 424 MS
EKG R AXIS: -8 DEGREES
EKG T AXIS: 63 DEGREES
EKG VENTRICULAR RATE: 74 BPM

## 2024-08-19 PROCEDURE — 93010 ELECTROCARDIOGRAM REPORT: CPT | Performed by: SPECIALIST

## 2024-08-19 PROCEDURE — 93005 ELECTROCARDIOGRAM TRACING: CPT

## 2024-08-19 PROCEDURE — 71046 X-RAY EXAM CHEST 2 VIEWS: CPT

## 2024-08-20 DIAGNOSIS — G89.29 CHRONIC PAIN OF LEFT KNEE: ICD-10-CM

## 2024-08-20 DIAGNOSIS — M25.562 CHRONIC PAIN OF LEFT KNEE: ICD-10-CM

## 2024-08-20 DIAGNOSIS — M17.12 PRIMARY OSTEOARTHRITIS OF LEFT KNEE: ICD-10-CM

## 2024-08-20 LAB
ANION GAP SERPL CALC-SCNC: 4 MMOL/L (ref 5–15)
BUN SERPL-MCNC: 18 MG/DL (ref 6–20)
BUN/CREAT SERPL: 17 (ref 12–20)
CALCIUM SERPL-MCNC: 9.2 MG/DL (ref 8.5–10.1)
CHLORIDE SERPL-SCNC: 106 MMOL/L (ref 97–108)
CO2 SERPL-SCNC: 28 MMOL/L (ref 21–32)
CREAT SERPL-MCNC: 1.07 MG/DL (ref 0.55–1.02)
ERYTHROCYTE [DISTWIDTH] IN BLOOD BY AUTOMATED COUNT: 12.5 % (ref 11.5–14.5)
GLUCOSE SERPL-MCNC: 97 MG/DL (ref 65–100)
HCT VFR BLD AUTO: 43.3 % (ref 35–47)
HGB BLD-MCNC: 14 G/DL (ref 11.5–16)
MCH RBC QN AUTO: 31 PG (ref 26–34)
MCHC RBC AUTO-ENTMCNC: 32.3 G/DL (ref 30–36.5)
MCV RBC AUTO: 95.8 FL (ref 80–99)
NRBC # BLD: 0 K/UL (ref 0–0.01)
NRBC BLD-RTO: 0 PER 100 WBC
PLATELET # BLD AUTO: 292 K/UL (ref 150–400)
PMV BLD AUTO: 10.1 FL (ref 8.9–12.9)
POTASSIUM SERPL-SCNC: 4.3 MMOL/L (ref 3.5–5.1)
RBC # BLD AUTO: 4.52 M/UL (ref 3.8–5.2)
SODIUM SERPL-SCNC: 138 MMOL/L (ref 136–145)
WBC # BLD AUTO: 8 K/UL (ref 3.6–11)

## 2024-09-03 DIAGNOSIS — M25.562 CHRONIC PAIN OF LEFT KNEE: ICD-10-CM

## 2024-09-03 DIAGNOSIS — G89.29 CHRONIC PAIN OF LEFT KNEE: ICD-10-CM

## 2024-09-03 DIAGNOSIS — M17.12 PRIMARY OSTEOARTHRITIS OF LEFT KNEE: ICD-10-CM

## 2024-09-06 ENCOUNTER — TELEPHONE (OUTPATIENT)
Age: 69
End: 2024-09-06

## 2024-09-06 NOTE — TELEPHONE ENCOUNTER
PT name and  verified    67 yo last ov/ae 23, next ae 9/10/24    [facility] calling stating they e-scribed and faxed a Rx request for Rx EC-RX Testosterone 0.2 % CREAM.  Relayed to pharmacist did not see a e-scribe notification and that PT is due for her ae on 9/10 and MD would refill at her ae.  Pharmacist verbalizes understanding and states he will resend as e-scribe and inform the PT that it may be refilled at her ae.

## 2024-09-11 ENCOUNTER — OFFICE VISIT (OUTPATIENT)
Age: 69
End: 2024-09-11

## 2024-09-11 VITALS
HEART RATE: 86 BPM | DIASTOLIC BLOOD PRESSURE: 81 MMHG | BODY MASS INDEX: 28.34 KG/M2 | WEIGHT: 160 LBS | SYSTOLIC BLOOD PRESSURE: 170 MMHG

## 2024-09-11 DIAGNOSIS — N95.1 POST MENOPAUSAL SYNDROME: ICD-10-CM

## 2024-09-11 RX ORDER — ESTRADIOL AND NORETHINDRONE ACETATE 1; .5 MG/1; MG/1
1 TABLET ORAL DAILY
Qty: 90 TABLET | Refills: 4 | Status: SHIPPED | OUTPATIENT
Start: 2024-09-11

## 2024-09-12 ENCOUNTER — TELEPHONE (OUTPATIENT)
Age: 69
End: 2024-09-12

## 2024-09-25 DIAGNOSIS — Z96.652 STATUS POST TOTAL LEFT KNEE REPLACEMENT: Primary | ICD-10-CM

## 2024-10-03 ENCOUNTER — OFFICE VISIT (OUTPATIENT)
Age: 69
End: 2024-10-03
Payer: MEDICARE

## 2024-10-03 DIAGNOSIS — E78.00 HIGH CHOLESTEROL: ICD-10-CM

## 2024-10-03 DIAGNOSIS — M17.12 PRIMARY OSTEOARTHRITIS OF LEFT KNEE: Primary | ICD-10-CM

## 2024-10-03 PROCEDURE — 99214 OFFICE O/P EST MOD 30 MIN: CPT | Performed by: ORTHOPAEDIC SURGERY

## 2024-10-03 PROCEDURE — G8484 FLU IMMUNIZE NO ADMIN: HCPCS | Performed by: ORTHOPAEDIC SURGERY

## 2024-10-03 PROCEDURE — 1123F ACP DISCUSS/DSCN MKR DOCD: CPT | Performed by: ORTHOPAEDIC SURGERY

## 2024-10-03 PROCEDURE — G8419 CALC BMI OUT NRM PARAM NOF/U: HCPCS | Performed by: ORTHOPAEDIC SURGERY

## 2024-10-03 PROCEDURE — G8427 DOCREV CUR MEDS BY ELIG CLIN: HCPCS | Performed by: ORTHOPAEDIC SURGERY

## 2024-10-03 PROCEDURE — 1036F TOBACCO NON-USER: CPT | Performed by: ORTHOPAEDIC SURGERY

## 2024-10-03 PROCEDURE — G8400 PT W/DXA NO RESULTS DOC: HCPCS | Performed by: ORTHOPAEDIC SURGERY

## 2024-10-03 PROCEDURE — 1090F PRES/ABSN URINE INCON ASSESS: CPT | Performed by: ORTHOPAEDIC SURGERY

## 2024-10-03 PROCEDURE — 3017F COLORECTAL CA SCREEN DOC REV: CPT | Performed by: ORTHOPAEDIC SURGERY

## 2024-10-03 RX ORDER — CEPHALEXIN 500 MG/1
500 CAPSULE ORAL EVERY 8 HOURS
Qty: 21 CAPSULE | Refills: 0 | Status: SHIPPED | OUTPATIENT
Start: 2024-10-03 | End: 2024-10-10

## 2024-10-03 RX ORDER — ASPIRIN 325 MG
325 TABLET, DELAYED RELEASE (ENTERIC COATED) ORAL 2 TIMES DAILY
Qty: 60 TABLET | Refills: 0 | Status: SHIPPED | OUTPATIENT
Start: 2024-10-03 | End: 2024-11-02

## 2024-10-03 RX ORDER — ONDANSETRON 8 MG/1
8 TABLET, ORALLY DISINTEGRATING ORAL EVERY 8 HOURS PRN
Qty: 20 TABLET | Refills: 0 | Status: SHIPPED | OUTPATIENT
Start: 2024-10-03

## 2024-10-03 RX ORDER — HYDROMORPHONE HYDROCHLORIDE 2 MG/1
2 TABLET ORAL
Qty: 30 TABLET | Refills: 0 | Status: SHIPPED | OUTPATIENT
Start: 2024-10-03 | End: 2024-10-11

## 2024-10-03 NOTE — PROGRESS NOTES
Name: Kaycee Richards    : 1955     [facility]OP[facility]ICS AND [facility]ORTS MEDICINE  [address]  [facility] [address]  Dept: [phone]  Dept Fax: [phone]     Chief Complaint   Patient presents with    Pre-op Exam    Knee Pain        LMP 2004      Allergies   Allergen Reactions    [place] Flavor Rash        Current Outpatient Medications   Medication Sig Dispense Refill    HYDROmorphone (DILAUDID) 2 MG tablet Take 1 tablet by mouth every 4-6 hours as needed for Pain for up to 8 days. DO NOT START PAIN MEDICATION UNTIL AFTER SURGERY! Max Daily Amount: 12 mg 30 tablet 0    ondansetron (ZOFRAN-ODT) 8 MG TBDP disintegrating tablet Take 1 tablet by mouth every 8 hours as needed for Nausea or Vomiting 20 tablet 0    aspirin 325 MG EC tablet Take 1 tablet by mouth in the morning and at bedtime DO NOT START TAKING UNTIL AFTER SURGERY!!  enteric coated aspirin - ONLY 60 tablet 0    cephALEXin (KEFLEX) 500 MG capsule Take 1 capsule by mouth every 8 (eight) hours for 7 days Do not start medication until after surgery 21 capsule 0    estradiol-norethindrone (ACTIVELLA) 1-0.5 MG per tablet Take 1 tablet by mouth daily 90 tablet 4    EC-RX Testosterone 0.2 % CREA Place 1 g onto the skin daily for 30 days. Max Daily Amount: 1 g 30 g 5     No current facility-administered medications for this visit.       There is no problem list on file for this patient.     Family History   Problem Relation Age of Onset    Breast Cancer Maternal Aunt     Breast Cancer Paternal Aunt     Diabetes Brother         Id.        Past Surgical History:   Procedure Laterality Date    HAND SURGERY  2010    KNEE ARTHROSCOPY  1982    KNEE SURGERY        Past Medical History:   Diagnosis Date    Baker's cyst     Bursitis     Osteoarthritis     Osteoporosis         I have reviewed and agree with PFSH and ROS and intake form in chart and the record furthermore I

## 2024-10-14 ENCOUNTER — HOSPITAL ENCOUNTER (OUTPATIENT)
Facility: HOSPITAL | Age: 69
Setting detail: RECURRING SERIES
Discharge: HOME | End: 2024-10-17
Payer: MEDICARE

## 2024-10-14 ENCOUNTER — TELEPHONE (OUTPATIENT)
Age: 69
End: 2024-10-14

## 2024-10-14 DIAGNOSIS — M25.562 LEFT KNEE PAIN, UNSPECIFIED CHRONICITY: ICD-10-CM

## 2024-10-14 DIAGNOSIS — Z96.652 STATUS POST TOTAL KNEE REPLACEMENT, LEFT: Primary | ICD-10-CM

## 2024-10-14 PROCEDURE — 97162 PT EVAL MOD COMPLEX 30 MIN: CPT

## 2024-10-14 PROCEDURE — 97535 SELF CARE MNGMENT TRAINING: CPT

## 2024-10-14 PROCEDURE — 97016 VASOPNEUMATIC DEVICE THERAPY: CPT

## 2024-10-14 PROCEDURE — 97110 THERAPEUTIC EXERCISES: CPT

## 2024-10-14 RX ORDER — OXYCODONE AND ACETAMINOPHEN 5; 325 MG/1; MG/1
1 TABLET ORAL
Qty: 30 TABLET | Refills: 0 | Status: SHIPPED | OUTPATIENT
Start: 2024-10-14 | End: 2024-10-22

## 2024-10-14 NOTE — THERAPY EVALUATION
[facility] at [place],   a part of [address], [address]  [place], [place] [address]  Phone: [phone]  Fax: [phone]       PHYSICAL THERAPY - MEDICARE EVALUATION/PLAN OF CARE NOTE (updated 3/23)      Date: 10/14/2024          Patient Name:  Kaycee Richards :  1955   Medical   Diagnosis:  Status post total left knee replacement [Z96.652] Treatment Diagnosis:  M25.562  LEFT KNEE PAIN and M79.652  Pain in left thigh M62.81  GENERAL MUSCLE WEAKNESS and R26.89   Abnormalities of gait and mobility    Referral Source:  Manuel Gregorio MD Provider #:  [id number]                Insurance: Payor: MEDICARE / Plan: MEDICARE PART A AND B / Product Type: *No Product type* /      Patient  verified yes     Visit #   Current  / Total 1 24   Time   In / Out 1:00p 2:15p   Total Treatment Time 75   Total Timed Codes 30   1:1 Treatment Time 30    [facility] Totals Reminder:  bill using total billable   min of TIMED therapeutic procedures and modalities.   8-22 min = 1 unit; 23-37 min = 2 units; 38-52 min = 3 units;  53-67 min = 4 units; 68-82 min = 5 units           SUBJECTIVE  Pain Level (0-10 scale): Today: 4 Lowest: 4 Worst: 8   []constant []intermittent [x]improving []worsening []no change since onset    Any medication changes, allergies to medications, adverse drug reactions, diagnosis change, or new procedure performed?: [x] No    [] Yes (see summary sheet for update)  Medications: Verified on Patient Summary List    Subjective functional status/changes:     Mrs. Richards is a 69 year old female s/p left TKA performed on 10/9/24 after chronic knee pain. Pt denies distal numbness,tingling, calf pain, or SOB    Start of Care: 10/14/2024  Onset Date: 10/9/24  Current symptoms/Complaints: L knee pain  Mechanism of Injury: Post-op 10/9/24  PLOF: Ind with ADLs, hiking, skiing  Limitations to PLOF/Activity or Recreational Limitations: Post-op pain and

## 2024-10-14 NOTE — TELEPHONE ENCOUNTER
Patient is requesting Percocet as a step down from Dilaudid.    LTKA 10/9/24    Dilaudid called in on 10/3/24, she is down to last tablet.    [facility] [id number] - [place], [place] - [address] [phone] - F [phone]

## 2024-10-16 ENCOUNTER — TELEMEDICINE (OUTPATIENT)
Age: 69
End: 2024-10-16

## 2024-10-16 DIAGNOSIS — M25.562 LEFT KNEE PAIN, UNSPECIFIED CHRONICITY: ICD-10-CM

## 2024-10-16 DIAGNOSIS — Z96.652 STATUS POST TOTAL KNEE REPLACEMENT, LEFT: Primary | ICD-10-CM

## 2024-10-16 RX ORDER — MELOXICAM 15 MG/1
15 TABLET ORAL DAILY
Qty: 30 TABLET | Refills: 0 | Status: SHIPPED | OUTPATIENT
Start: 2024-10-16

## 2024-10-16 NOTE — PATIENT INSTRUCTIONS
swelling, or shortness of breath, please call our office immediately or go to the nearest emergency room.    If you notice any significant increase in swelling of the knee or leg, redness around the incision site, drainage, pus, or any oozing fluid from the incision please notify our office immediately.      If you develop a fever greater than 101.5 or have any significant trauma or falls, please call and notify our office.  A low-grade temperature below 101 can be normal for the first few weeks.     Bruising & pain around your thigh, leg, & foot are normal for up to 6-8 weeks. Some localized swelling and minor aches to the knee may linger for upwards of 12 months post-op.     You may experience a clicking or clunking noise in your knee, this is normal you now have an artificial implant in place that can cause these noises.     Moving forward if you have any type of DENTAL WORK/CLEANINGS or any invasive procedures that involve a risk of bleeding we recommend that you pre medicate one hour prior to these procedures with antibiotics. Please let the performing provider know that you have a total knee implant in place so they may pre medicate you accordingly. Please contact our office at least 72 hours prior to needing these medications if they do not. Please do not resume any routine dental cleanings or other invasive procedures until you are at least 6 weeks out from your surgery.     At any time if you feel like you have stopped progressing in therapy or your knee feels worse, please call our office for an appointment to be seen.

## 2024-10-16 NOTE — PROGRESS NOTES
Kaycee Richards (:  1955) is a Established patient, evaluated via audio/visual telemedicine on 10/16/2024    [facility] ORTHOPAEDICS AND SPORTS MEDICINE  [address], [address]  [facility] [address]  Dept: [phone]  Dept Fax: [phone]   Chief Complaint   Patient presents with    Post-Op Check    Knee Pain     Patient-Reported Vitals  No data recorded   Allergies   Allergen Reactions    [place] Flavor Rash     Current Outpatient Medications   Medication Sig Dispense Refill    meloxicam (MOBIC) 15 MG tablet Take 1 tablet by mouth daily 30 tablet 0    oxyCODONE-acetaminophen (PERCOCET) 5-325 MG per tablet Take 1 tablet by mouth every 4-6 hours as needed for Pain for up to 8 days. Max Daily Amount: 6 tablets 30 tablet 0    ondansetron (ZOFRAN-ODT) 8 MG TBDP disintegrating tablet Take 1 tablet by mouth every 8 hours as needed for Nausea or Vomiting 20 tablet 0    aspirin 325 MG EC tablet Take 1 tablet by mouth in the morning and at bedtime DO NOT START TAKING UNTIL AFTER SURGERY!!  enteric coated aspirin - ONLY 60 tablet 0    estradiol-norethindrone (ACTIVELLA) 1-0.5 MG per tablet Take 1 tablet by mouth daily 90 tablet 4    EC-RX Testosterone 0.2 % CREA Place 1 g onto the skin daily for 30 days. Max Daily Amount: 1 g 30 g 5     No current facility-administered medications for this visit.      There is no problem list on file for this patient.     Family History   Problem Relation Age of Onset    Breast Cancer Maternal Aunt     Breast Cancer Paternal Aunt     Diabetes Brother         Id.       Social History     Socioeconomic History    Marital status:      Spouse name: None    Number of children: None    Years of education: None    Highest education level: None   Tobacco Use    Smoking status: Never    Smokeless tobacco: Never   Vaping Use    Vaping status: Never Used   Substance and Sexual Activity    Alcohol use: Yes     Alcohol/week: 4.0

## 2024-10-17 ENCOUNTER — HOSPITAL ENCOUNTER (OUTPATIENT)
Facility: HOSPITAL | Age: 69
Setting detail: RECURRING SERIES
Discharge: HOME | End: 2024-10-20
Payer: MEDICARE

## 2024-10-17 PROCEDURE — 97110 THERAPEUTIC EXERCISES: CPT

## 2024-10-17 PROCEDURE — 97016 VASOPNEUMATIC DEVICE THERAPY: CPT

## 2024-10-17 NOTE — PROGRESS NOTES
PHYSICAL THERAPY - MEDICARE DAILY TREATMENT NOTE (updated 3/23)      Date: 10/17/2024          Patient Name:  Kaycee Richards :  1955   Medical   Diagnosis:  Status post total left knee replacement [Z96.652] Treatment Diagnosis:  M25.562  LEFT KNEE PAIN and M79.652  Pain in left thigh  and M62.81  GENERAL MUSCLE WEAKNESS and R26.89   Abnormalities of gait and mobility    Referral Source:  Manuel Gregorio MD Insurance:   Payor: MEDICARE / Plan: MEDICARE PART A AND B / Product Type: *No Product type* /                     Patient  verified yes     Visit #   Current  / Total 2 24   Time   In / Out 5:30 pm 6:30 pm   Total Treatment Time 60   Total Timed Codes 60   1:1 Treatment Time 45      [facility] Totals Reminder:  bill using total billable   min of TIMED therapeutic procedures and modalities.   8-22 min = 1 unit; 23-37 min = 2 units; 38-52 min = 3 units; 53-67 min = 4 units; 68-82 min = 5 units            SUBJECTIVE    Pain Level (0-10 scale): 1    Any medication changes, allergies to medications, adverse drug reactions, diagnosis change, or new procedure performed?: [x] No    [] Yes (see summary sheet for update)  Medications: Verified on Patient Summary List    Subjective functional status/changes:     Pt reporting good initial progress following her IE and performance of her HEP.     OBJECTIVE      Therapeutic Procedures:  Tx Min Billable or 1:1 Min (if diff from Tx Min) Procedure, Rationale, Specifics   40  51960 Therapeutic Exercise (timed):  increase ROM, strength, coordination, balance, and proprioception to improve patient's ability to progress to PLOF and address remaining functional goals. (see flow sheet as applicable)     Details if applicable:     5  20768 Manual Therapy (timed):  decrease pain and increase ROM to improve patient's ability to progress to PLOF and address remaining functional goals.  The manual therapy interventions were performed at a separate and distinct time from the

## 2024-10-18 ENCOUNTER — TELEPHONE (OUTPATIENT)
Age: 69
End: 2024-10-18

## 2024-10-22 ENCOUNTER — TELEPHONE (OUTPATIENT)
Age: 69
End: 2024-10-22

## 2024-10-22 DIAGNOSIS — Z96.652 STATUS POST TOTAL KNEE REPLACEMENT, LEFT: ICD-10-CM

## 2024-10-22 DIAGNOSIS — M25.562 LEFT KNEE PAIN, UNSPECIFIED CHRONICITY: ICD-10-CM

## 2024-10-22 RX ORDER — OXYCODONE AND ACETAMINOPHEN 5; 325 MG/1; MG/1
1 TABLET ORAL
Qty: 30 TABLET | Refills: 0 | Status: SHIPPED | OUTPATIENT
Start: 2024-10-22 | End: 2024-10-30

## 2024-10-22 NOTE — TELEPHONE ENCOUNTER
Patient called in requesting pain medication refill.      Surgery: LT TKR 10/09/2024      Medication: Percocet       Last Refill: 10/14/2024      Pharmacy:[facility] [id number] - [place], [place] - [address] - P [phone] - F [phone]  [address], [facility] [address]  Phone: [phone]  Fax: [phone]

## 2024-10-23 ENCOUNTER — HOSPITAL ENCOUNTER (OUTPATIENT)
Facility: HOSPITAL | Age: 69
Setting detail: RECURRING SERIES
Discharge: HOME | End: 2024-10-26
Payer: MEDICARE

## 2024-10-23 PROCEDURE — 97110 THERAPEUTIC EXERCISES: CPT

## 2024-10-23 PROCEDURE — 97016 VASOPNEUMATIC DEVICE THERAPY: CPT

## 2024-10-23 NOTE — PROGRESS NOTES
PHYSICAL THERAPY - MEDICARE DAILY TREATMENT NOTE (updated 3/23)      Date: 10/23/2024          Patient Name:  Kaycee Richards :  1955   Medical   Diagnosis:  Status post total left knee replacement [Z96.652] Treatment Diagnosis:  M25.562  LEFT KNEE PAIN and M79.652  Pain in left thigh  and M62.81  GENERAL MUSCLE WEAKNESS and R26.89   Abnormalities of gait and mobility    Referral Source:  Manuel Gregorio MD Insurance:   Payor: MEDICARE / Plan: MEDICARE PART A AND B / Product Type: *No Product type* /                     Patient  verified yes     Visit #   Current  / Total 3 24   Time   In / Out 5:45 pm 6:45 pm   Total Treatment Time 60   Total Timed Codes 45   1:1 Treatment Time 45      [facility] Totals Reminder:  bill using total billable   min of TIMED therapeutic procedures and modalities.   8-22 min = 1 unit; 23-37 min = 2 units; 38-52 min = 3 units; 53-67 min = 4 units; 68-82 min = 5 units            SUBJECTIVE    Pain Level (0-10 scale): 1    Any medication changes, allergies to medications, adverse drug reactions, diagnosis change, or new procedure performed?: [x] No    [] Yes (see summary sheet for update)  Medications: Verified on Patient Summary List    Subjective functional status/changes:     Pt reporting able to do 1k steps today on her walk. Still taking pain medication every 6 hours.    OBJECTIVE      Therapeutic Procedures:  Tx Min Billable or 1:1 Min (if diff from Tx Min) Procedure, Rationale, Specifics   45  34991 Therapeutic Exercise (timed):  increase ROM, strength, coordination, balance, and proprioception to improve patient's ability to progress to PLOF and address remaining functional goals. (see flow sheet as applicable)     Details if applicable:       44480 Manual Therapy (timed):  decrease pain and increase ROM to improve patient's ability to progress to PLOF and address remaining functional goals.  The manual therapy interventions were performed at a separate and distinct time from

## 2024-10-28 ENCOUNTER — HOSPITAL ENCOUNTER (OUTPATIENT)
Facility: HOSPITAL | Age: 69
Setting detail: RECURRING SERIES
Discharge: HOME | End: 2024-10-31
Payer: MEDICARE

## 2024-10-28 PROCEDURE — 97016 VASOPNEUMATIC DEVICE THERAPY: CPT

## 2024-10-28 PROCEDURE — 97110 THERAPEUTIC EXERCISES: CPT

## 2024-10-28 NOTE — PROGRESS NOTES
PHYSICAL THERAPY - MEDICARE DAILY TREATMENT NOTE (updated 3/23)      Date: 10/28/2024          Patient Name:  Kaycee Richards :  1955   Medical   Diagnosis:  Status post total left knee replacement [Z96.652] Treatment Diagnosis:  M25.562  LEFT KNEE PAIN and M79.652  Pain in left thigh  and M62.81  GENERAL MUSCLE WEAKNESS and R26.89   Abnormalities of gait and mobility    Referral Source:  Manuel Gregorio MD Insurance:   Payor: MEDICARE / Plan: MEDICARE PART A AND B / Product Type: *No Product type* /                     Patient  verified yes     Visit #   Current  / Total 4 24   Time   In / Out 3:15 pm 4:15 pm   Total Treatment Time 60   Total Timed Codes 45   1:1 Treatment Time 45      [facility] Totals Reminder:  bill using total billable   min of TIMED therapeutic procedures and modalities.   8-22 min = 1 unit; 23-37 min = 2 units; 38-52 min = 3 units; 53-67 min = 4 units; 68-82 min = 5 units            SUBJECTIVE    Pain Level (0-10 scale): 1    Any medication changes, allergies to medications, adverse drug reactions, diagnosis change, or new procedure performed?: [x] No    [] Yes (see summary sheet for update)  Medications: Verified on Patient Summary List    Subjective functional status/changes:     Pt reporting continued progress at this time with no new complaints.     OBJECTIVE      Therapeutic Procedures:  Tx Min Billable or 1:1 Min (if diff from Tx Min) Procedure, Rationale, Specifics   45  36272 Therapeutic Exercise (timed):  increase ROM, strength, coordination, balance, and proprioception to improve patient's ability to progress to PLOF and address remaining functional goals. (see flow sheet as applicable)     Details if applicable:       92747 Manual Therapy (timed):  decrease pain and increase ROM to improve patient's ability to progress to PLOF and address remaining functional goals.  The manual therapy interventions were performed at a separate and distinct time from the therapeutic activities

## 2024-10-31 ENCOUNTER — HOSPITAL ENCOUNTER (OUTPATIENT)
Facility: HOSPITAL | Age: 69
Setting detail: RECURRING SERIES
Discharge: HOME | End: 2024-11-03
Payer: MEDICARE

## 2024-10-31 PROCEDURE — 97110 THERAPEUTIC EXERCISES: CPT | Performed by: PHYSICAL THERAPIST

## 2024-10-31 PROCEDURE — 97110 THERAPEUTIC EXERCISES: CPT

## 2024-10-31 PROCEDURE — 97140 MANUAL THERAPY 1/> REGIONS: CPT | Performed by: PHYSICAL THERAPIST

## 2024-10-31 PROCEDURE — 97140 MANUAL THERAPY 1/> REGIONS: CPT

## 2024-10-31 PROCEDURE — 97016 VASOPNEUMATIC DEVICE THERAPY: CPT | Performed by: PHYSICAL THERAPIST

## 2024-10-31 PROCEDURE — 97016 VASOPNEUMATIC DEVICE THERAPY: CPT

## 2024-10-31 NOTE — PROGRESS NOTES
PHYSICAL THERAPY - MEDICARE DAILY TREATMENT NOTE (updated 3/23)      Date: 10/31/2024          Patient Name:  Kaycee Richards :  1955   Medical   Diagnosis:  Status post total left knee replacement [Z96.652] Treatment Diagnosis:  M25.562  LEFT KNEE PAIN and M79.652  Pain in left thigh  and M62.81  GENERAL MUSCLE WEAKNESS and R26.89   Abnormalities of gait and mobility    Referral Source:  Manuel Gregorio MD Insurance:   Payor: MEDICARE / Plan: MEDICARE PART A AND B / Product Type: *No Product type* /                     Patient  verified yes     Visit #   Current  / Total 5 24   Time   In / Out 2:30p 3:30p   Total Treatment Time 60   Total Timed Codes 45   1:1 Treatment Time 45      [facility] Totals Reminder:  bill using total billable   min of TIMED therapeutic procedures and modalities.   8-22 min = 1 unit; 23-37 min = 2 units; 38-52 min = 3 units; 53-67 min = 4 units; 68-82 min = 5 units            SUBJECTIVE    Pain Level (0-10 scale): 1    Any medication changes, allergies to medications, adverse drug reactions, diagnosis change, or new procedure performed?: [x] No    [] Yes (see summary sheet for update)  Medications: Verified on Patient Summary List    Subjective functional status/changes:     Pt reporting continued progress at this time with no new complaints.     OBJECTIVE      Therapeutic Procedures:  Tx Min Billable or 1:1 Min (if diff from Tx Min) Procedure, Rationale, Specifics   30  84224 Therapeutic Exercise (timed):  increase ROM, strength, coordination, balance, and proprioception to improve patient's ability to progress to PLOF and address remaining functional goals. (see flow sheet as applicable)     Details if applicable:     15  09130 Manual Therapy (timed):  decrease pain and increase ROM to improve patient's ability to progress to PLOF and address remaining functional goals.  The manual therapy interventions were performed at a separate and distinct time from the therapeutic activities

## 2024-11-04 ENCOUNTER — HOSPITAL ENCOUNTER (OUTPATIENT)
Facility: HOSPITAL | Age: 69
Setting detail: RECURRING SERIES
Discharge: HOME | End: 2024-11-07
Payer: MEDICARE

## 2024-11-04 PROCEDURE — 97112 NEUROMUSCULAR REEDUCATION: CPT | Performed by: PHYSICAL THERAPIST

## 2024-11-04 PROCEDURE — 97016 VASOPNEUMATIC DEVICE THERAPY: CPT | Performed by: PHYSICAL THERAPIST

## 2024-11-04 PROCEDURE — 97110 THERAPEUTIC EXERCISES: CPT | Performed by: PHYSICAL THERAPIST

## 2024-11-04 PROCEDURE — 97140 MANUAL THERAPY 1/> REGIONS: CPT | Performed by: PHYSICAL THERAPIST

## 2024-11-04 NOTE — PROGRESS NOTES
PHYSICAL THERAPY - MEDICARE DAILY TREATMENT NOTE (updated 3/23)      Date: 2024          Patient Name:  Kaycee Richards :  1955   Medical   Diagnosis:  Status post total left knee replacement [Z96.652] Treatment Diagnosis:  M25.562  LEFT KNEE PAIN and M79.652  Pain in left thigh  and M62.81  GENERAL MUSCLE WEAKNESS and R26.89   Abnormalities of gait and mobility    Referral Source:  Manuel Gregorio MD Insurance:   Payor: MEDICARE / Plan: MEDICARE PART A AND B / Product Type: *No Product type* /                     Patient  verified yes     Visit #   Current  / Total 6 24   Time   In / Out 11:15a 12:15p   Total Treatment Time 60   Total Timed Codes 45   1:1 Treatment Time 45      [facility] Totals Reminder:  bill using total billable   min of TIMED therapeutic procedures and modalities.   8-22 min = 1 unit; 23-37 min = 2 units; 38-52 min = 3 units; 53-67 min = 4 units; 68-82 min = 5 units            SUBJECTIVE    Pain Level (0-10 scale): 1    Any medication changes, allergies to medications, adverse drug reactions, diagnosis change, or new procedure performed?: [x] No    [] Yes (see summary sheet for update)  Medications: Verified on Patient Summary List    Subjective functional status/changes:     Pt reporting improving tolerance with ambulation. Pt reporting some achiness in the knee towards the end of the day and when she is sleeping.     OBJECTIVE      Therapeutic Procedures:  Tx Min Billable or 1:1 Min (if diff from Tx Min) Procedure, Rationale, Specifics   30  49517 Therapeutic Exercise (timed):  increase ROM, strength, coordination, balance, and proprioception to improve patient's ability to progress to PLOF and address remaining functional goals. (see flow sheet as applicable)     Details if applicable:     15  72889 Manual Therapy (timed):  decrease pain and increase ROM to improve patient's ability to progress to PLOF and address remaining functional goals.  The manual therapy interventions were

## 2024-11-06 ENCOUNTER — TELEMEDICINE (OUTPATIENT)
Age: 69
End: 2024-11-06

## 2024-11-06 DIAGNOSIS — Z96.652 STATUS POST TOTAL KNEE REPLACEMENT, LEFT: Primary | ICD-10-CM

## 2024-11-06 DIAGNOSIS — M25.562 LEFT KNEE PAIN, UNSPECIFIED CHRONICITY: ICD-10-CM

## 2024-11-06 PROCEDURE — 99024 POSTOP FOLLOW-UP VISIT: CPT

## 2024-11-06 NOTE — PROGRESS NOTES
College years    Sexual activity: Yes     Partners: Male     Social Determinants of Health     Physical Activity: Sufficiently Active (5/20/2024)    Exercise Vital Sign     Days of Exercise per Week: 5 days     Minutes of Exercise per Session: 60 min   Intimate Partner Violence: Not At Risk (8/30/2022)    Humiliation, Afraid, Rape, and Kick questionnaire     Fear of Current or Ex-Partner: No     Emotionally Abused: No     Physically Abused: No     Sexually Abused: No      Past Surgical History:   Procedure Laterality Date    HAND SURGERY  2010 2020    KNEE ARTHROSCOPY  1982    KNEE SURGERY  1982      Past Medical History:   Diagnosis Date    Baker's cyst     Bursitis     Osteoarthritis     Osteoporosis         I have reviewed and agree with PFS and ROS and intake form in chart and the record furthermore I have reviewed prior medical record(s) regarding this patients care during this appointment.   Subjective:      Patient presents for postop care following a left total knee replacement on 10/9/2024.  Patient is ambulate well without assistive devices.  Pain is a 1 out of 10 mostly at night just a dull ache.  Patient has been using CBD oil, Tylenol, Aleve and Voltaren gel to assist with pain.  Per PT note dated 10/31/2024, patient's active assistive range of motion is 1 to 127 degrees.    Objective:     There were no vitals taken for this visit.    General:  alert, cooperative, no distress, appears stated age   ROM: Left knee - Neurovascularly intact with good cap refill, full range of motion and full strength, well healed incision noted, no swelling, no erythema, no instability.     Right knee - Decrease range of motion with flexion, Some crepitation, Grossly neurovascularly intact, Good cap refill, No skin lesion, Moderate swelling, No gross instability, Some quadriceps weakness     Incision:   healing well, no drainage, no erythema, incision well approximated, no swelling     Assessment:     Doing well

## 2024-11-07 ENCOUNTER — HOSPITAL ENCOUNTER (OUTPATIENT)
Facility: HOSPITAL | Age: 69
Setting detail: RECURRING SERIES
Discharge: HOME | End: 2024-11-10
Payer: MEDICARE

## 2024-11-07 PROCEDURE — 97110 THERAPEUTIC EXERCISES: CPT | Performed by: PHYSICAL THERAPIST

## 2024-11-07 PROCEDURE — 97140 MANUAL THERAPY 1/> REGIONS: CPT | Performed by: PHYSICAL THERAPIST

## 2024-11-07 PROCEDURE — 97016 VASOPNEUMATIC DEVICE THERAPY: CPT | Performed by: PHYSICAL THERAPIST

## 2024-11-07 NOTE — PROGRESS NOTES
services to modify and progress therapeutic interventions, analyze and address functional mobility deficits, analyze and address ROM deficits, analyze and address strength deficits, analyze and address soft tissue restrictions, analyze and cue for proper movement patterns, analyze and modify for postural abnormalities, analyze and address imbalance/dizziness, and instruct in home and community integration to address functional deficits and attain remaining goals.    Progress toward goals / Updated goals:  []  See Progress Note/Recertification    Continue to progress as tolerated      PLAN  Yes  Continue plan of care  Re-Cert Due: 10/14/24-01/12/25   [x]  Upgrade activities as tolerated  []  Discharge due to:  []  Other:      Johnathan Freeman, PT, DPT      11/7/2024       12:01 PM

## 2024-11-11 ENCOUNTER — HOSPITAL ENCOUNTER (OUTPATIENT)
Facility: HOSPITAL | Age: 69
Setting detail: RECURRING SERIES
Discharge: HOME | End: 2024-11-14
Payer: MEDICARE

## 2024-11-11 PROCEDURE — 97016 VASOPNEUMATIC DEVICE THERAPY: CPT | Performed by: PHYSICAL THERAPIST

## 2024-11-11 PROCEDURE — 97112 NEUROMUSCULAR REEDUCATION: CPT | Performed by: PHYSICAL THERAPIST

## 2024-11-11 PROCEDURE — 97110 THERAPEUTIC EXERCISES: CPT | Performed by: PHYSICAL THERAPIST

## 2024-11-11 NOTE — PROGRESS NOTES
PHYSICAL THERAPY - MEDICARE DAILY TREATMENT NOTE (updated 3/23)      Date: 2024          Patient Name:  Kaycee Richards :  1955   Medical   Diagnosis:  Status post total left knee replacement [Z96.652] Treatment Diagnosis:  M25.562  LEFT KNEE PAIN and M79.652  Pain in left thigh  and M62.81  GENERAL MUSCLE WEAKNESS and R26.89   Abnormalities of gait and mobility    Referral Source:  Manuel Gregorio MD Insurance:   Payor: MEDICARE / Plan: MEDICARE PART A AND B / Product Type: *No Product type* /                     Patient  verified yes     Visit #   Current  / Total 8 24   Time   In / Out 2:45p 3:40   Total Treatment Time 55   Total Timed Codes 45   1:1 Treatment Time 45      [facility] Totals Reminder:  bill using total billable   min of TIMED therapeutic procedures and modalities.   8-22 min = 1 unit; 23-37 min = 2 units; 38-52 min = 3 units; 53-67 min = 4 units; 68-82 min = 5 units            SUBJECTIVE    Pain Level (0-10 scale): 0     Any medication changes, allergies to medications, adverse drug reactions, diagnosis change, or new procedure performed?: [x] No    [] Yes (see summary sheet for update)  Medications: Verified on Patient Summary List    Subjective functional status/changes:     No new c/o - continuing to feel sore soreness at night     OBJECTIVE      Therapeutic Procedures:  Tx Min Billable or 1:1 Min (if diff from Tx Min) Procedure, Rationale, Specifics   30  52135 Therapeutic Exercise (timed):  increase ROM, strength, coordination, balance, and proprioception to improve patient's ability to progress to PLOF and address remaining functional goals. (see flow sheet as applicable)     Details if applicable:     15  72803 Manual Therapy (timed):  decrease pain and increase ROM to improve patient's ability to progress to PLOF and address remaining functional goals.  The manual therapy interventions were performed at a separate and distinct time from the therapeutic activities interventions .

## 2024-11-14 ENCOUNTER — HOSPITAL ENCOUNTER (OUTPATIENT)
Facility: HOSPITAL | Age: 69
Setting detail: RECURRING SERIES
Discharge: HOME | End: 2024-11-17
Payer: MEDICARE

## 2024-11-14 PROCEDURE — 97110 THERAPEUTIC EXERCISES: CPT | Performed by: PHYSICAL THERAPIST

## 2024-11-14 PROCEDURE — 97140 MANUAL THERAPY 1/> REGIONS: CPT | Performed by: PHYSICAL THERAPIST

## 2024-11-14 PROCEDURE — 97016 VASOPNEUMATIC DEVICE THERAPY: CPT | Performed by: PHYSICAL THERAPIST

## 2024-11-14 NOTE — PROGRESS NOTES
PHYSICAL THERAPY - MEDICARE DAILY TREATMENT NOTE (updated 3/23)      Date: 2024          Patient Name:  Kaycee Richards :  1955   Medical   Diagnosis:  Status post total left knee replacement [Z96.652] Treatment Diagnosis:  M25.562  LEFT KNEE PAIN and M79.652  Pain in left thigh  and M62.81  GENERAL MUSCLE WEAKNESS and R26.89   Abnormalities of gait and mobility    Referral Source:  Manuel Gregorio MD Insurance:   Payor: MEDICARE / Plan: MEDICARE PART A AND B / Product Type: *No Product type* /                     Patient  verified yes     Visit #   Current  / Total 9 24   Time   In / Out 1:45p 2:40p   Total Treatment Time 55   Total Timed Codes 45   1:1 Treatment Time 45      [facility] Totals Reminder:  bill using total billable   min of TIMED therapeutic procedures and modalities.   8-22 min = 1 unit; 23-37 min = 2 units; 38-52 min = 3 units; 53-67 min = 4 units; 68-82 min = 5 units            SUBJECTIVE    Pain Level (0-10 scale): last night: 5 today: 3     Any medication changes, allergies to medications, adverse drug reactions, diagnosis change, or new procedure performed?: [x] No    [] Yes (see summary sheet for update)  Medications: Verified on Patient Summary List    Subjective functional status/changes:       Patient reporting improvement in soreness with stretching before bed. Noticing and increase in soreness in the medial aspect of the knee today, likely due to change in weather       OBJECTIVE      Therapeutic Procedures:  Tx Min Billable or 1:1 Min (if diff from Tx Min) Procedure, Rationale, Specifics   30  57382 Therapeutic Exercise (timed):  increase ROM, strength, coordination, balance, and proprioception to improve patient's ability to progress to PLOF and address remaining functional goals. (see flow sheet as applicable)     Details if applicable:     15  77535 Manual Therapy (timed):  decrease pain and increase ROM to improve patient's ability to progress to PLOF and address remaining

## 2024-11-25 RX ORDER — ESTRADIOL AND NORETHINDRONE ACETATE 1; .5 MG/1; MG/1
1 TABLET ORAL DAILY
Qty: 84 TABLET | OUTPATIENT
Start: 2024-11-25

## 2024-11-26 ENCOUNTER — HOSPITAL ENCOUNTER (OUTPATIENT)
Facility: HOSPITAL | Age: 69
Setting detail: RECURRING SERIES
Discharge: HOME | End: 2024-11-29
Payer: MEDICARE

## 2024-11-26 ENCOUNTER — TRANSCRIBE ORDERS (OUTPATIENT)
Facility: HOSPITAL | Age: 69
End: 2024-11-26

## 2024-11-26 DIAGNOSIS — Z12.31 VISIT FOR SCREENING MAMMOGRAM: Primary | ICD-10-CM

## 2024-11-26 PROCEDURE — 97140 MANUAL THERAPY 1/> REGIONS: CPT | Performed by: PHYSICAL THERAPIST

## 2024-11-26 PROCEDURE — 97016 VASOPNEUMATIC DEVICE THERAPY: CPT | Performed by: PHYSICAL THERAPIST

## 2024-11-26 PROCEDURE — 97110 THERAPEUTIC EXERCISES: CPT | Performed by: PHYSICAL THERAPIST

## 2024-11-26 NOTE — PROGRESS NOTES
address ROM deficits, analyze and address strength deficits, analyze and address soft tissue restrictions, analyze and cue for proper movement patterns, analyze and modify for postural abnormalities, analyze and address imbalance/dizziness, and instruct in home and community integration to address functional deficits and attain remaining goals.    Progress toward goals / Updated goals:  []  See Progress Note/Recertification    Continue to progress as tolerated      PLAN  Yes  Continue plan of care  Re-Cert Due: 10/14/24-01/12/25   [x]  Upgrade activities as tolerated  []  Discharge due to:  []  Other:      Johnathan Freeman, PT, DPT      11/26/2024       2:36 PM

## 2024-12-03 ENCOUNTER — HOSPITAL ENCOUNTER (OUTPATIENT)
Facility: HOSPITAL | Age: 69
Setting detail: RECURRING SERIES
Discharge: HOME | End: 2024-12-06
Payer: MEDICARE

## 2024-12-03 PROCEDURE — 97110 THERAPEUTIC EXERCISES: CPT | Performed by: PHYSICAL THERAPIST

## 2024-12-03 PROCEDURE — 97140 MANUAL THERAPY 1/> REGIONS: CPT | Performed by: PHYSICAL THERAPIST

## 2024-12-03 PROCEDURE — 97016 VASOPNEUMATIC DEVICE THERAPY: CPT | Performed by: PHYSICAL THERAPIST

## 2024-12-03 NOTE — PROGRESS NOTES
PHYSICAL THERAPY - MEDICARE DAILY TREATMENT NOTE (updated 3/23)      Date: 12/3/2024          Patient Name:  Kaycee Richards :  1955   Medical   Diagnosis:  Status post total left knee replacement [Z96.652] Treatment Diagnosis:  M25.562  LEFT KNEE PAIN and M79.652  Pain in left thigh  and M62.81  GENERAL MUSCLE WEAKNESS and R26.89   Abnormalities of gait and mobility    Referral Source:  Manuel Gregorio MD Insurance:   Payor: MEDICARE / Plan: MEDICARE PART A AND B / Product Type: *No Product type* /                     Patient  verified yes     Visit #   Current  / Total 11 24   Time   In / Out 2:30p 3:25p   Total Treatment Time 55   Total Timed Codes 45   1:1 Treatment Time 45      [facility] BC Totals Reminder:  bill using total billable   min of TIMED therapeutic procedures and modalities.   8-22 min = 1 unit; 23-37 min = 2 units; 38-52 min = 3 units; 53-67 min = 4 units; 68-82 min = 5 units            SUBJECTIVE    Pain Level (0-10 scale): 0    Any medication changes, allergies to medications, adverse drug reactions, diagnosis change, or new procedure performed?: [x] No    [] Yes (see summary sheet for update)  Medications: Verified on Patient Summary List    Subjective functional status/changes:       Pt noting some hamstring soreness       OBJECTIVE      Therapeutic Procedures:  Tx Min Billable or 1:1 Min (if diff from Tx Min) Procedure, Rationale, Specifics   30  50990 Therapeutic Exercise (timed):  increase ROM, strength, coordination, balance, and proprioception to improve patient's ability to progress to PLOF and address remaining functional goals. (see flow sheet as applicable)     Details if applicable:     15  07094 Manual Therapy (timed):  decrease pain and increase ROM to improve patient's ability to progress to PLOF and address remaining functional goals.  The manual therapy interventions were performed at a separate and distinct time from the therapeutic activities interventions . (see flow sheet as

## 2024-12-05 ENCOUNTER — HOSPITAL ENCOUNTER (OUTPATIENT)
Facility: HOSPITAL | Age: 69
Setting detail: RECURRING SERIES
Discharge: HOME | End: 2024-12-08
Payer: MEDICARE

## 2024-12-05 PROCEDURE — 97140 MANUAL THERAPY 1/> REGIONS: CPT | Performed by: PHYSICAL THERAPIST

## 2024-12-05 PROCEDURE — 97110 THERAPEUTIC EXERCISES: CPT | Performed by: PHYSICAL THERAPIST

## 2024-12-05 PROCEDURE — 97016 VASOPNEUMATIC DEVICE THERAPY: CPT | Performed by: PHYSICAL THERAPIST

## 2024-12-05 NOTE — PROGRESS NOTES
PHYSICAL THERAPY - MEDICARE DAILY TREATMENT NOTE (updated 3/23)      Date: 2024          Patient Name:  Kaycee Richards :  1955   Medical   Diagnosis:  Status post total left knee replacement [Z96.652] Treatment Diagnosis:  M25.562  LEFT KNEE PAIN and M79.652  Pain in left thigh  and M62.81  GENERAL MUSCLE WEAKNESS and R26.89   Abnormalities of gait and mobility    Referral Source:  Manuel Gregorio MD Insurance:   Payor: MEDICARE / Plan: MEDICARE PART A AND B / Product Type: *No Product type* /                     Patient  verified yes     Visit #   Current  / Total 12 24   Time   In / Out 1:45p 2:45p   Total Treatment Time 60   Total Timed Codes 45   1:1 Treatment Time 45      [facility] BC Totals Reminder:  bill using total billable   min of TIMED therapeutic procedures and modalities.   8-22 min = 1 unit; 23-37 min = 2 units; 38-52 min = 3 units; 53-67 min = 4 units; 68-82 min = 5 units            SUBJECTIVE    Pain Level (0-10 scale): 0    Any medication changes, allergies to medications, adverse drug reactions, diagnosis change, or new procedure performed?: [x] No    [] Yes (see summary sheet for update)  Medications: Verified on Patient Summary List    Subjective functional status/changes:       Pt cont to c/o of mild lateral HS soreness.       OBJECTIVE      Therapeutic Procedures:  Tx Min Billable or 1:1 Min (if diff from Tx Min) Procedure, Rationale, Specifics   30  52059 Therapeutic Exercise (timed):  increase ROM, strength, coordination, balance, and proprioception to improve patient's ability to progress to PLOF and address remaining functional goals. (see flow sheet as applicable)     Details if applicable:     15  33797 Manual Therapy (timed):  decrease pain and increase ROM to improve patient's ability to progress to PLOF and address remaining functional goals.  The manual therapy interventions were performed at a separate and distinct time from the therapeutic activities interventions . (see

## 2024-12-10 ENCOUNTER — APPOINTMENT (OUTPATIENT)
Facility: HOSPITAL | Age: 69
End: 2024-12-10
Payer: MEDICARE

## 2024-12-10 ENCOUNTER — HOSPITAL ENCOUNTER (OUTPATIENT)
Facility: HOSPITAL | Age: 69
Setting detail: RECURRING SERIES
Discharge: HOME | End: 2024-12-13
Payer: MEDICARE

## 2024-12-10 PROCEDURE — 97110 THERAPEUTIC EXERCISES: CPT | Performed by: PHYSICAL THERAPIST

## 2024-12-10 PROCEDURE — 97140 MANUAL THERAPY 1/> REGIONS: CPT | Performed by: PHYSICAL THERAPIST

## 2024-12-10 PROCEDURE — 97016 VASOPNEUMATIC DEVICE THERAPY: CPT | Performed by: PHYSICAL THERAPIST

## 2024-12-10 NOTE — PROGRESS NOTES
activities interventions . (see flow sheet as applicable)     Details if applicable:  Grade 2-3 anterior tibiofemoral jt mobs, inf-sup patellar mob grade 3-4, STM, medial and lateral HS   45     Total Total     Modalities Rationale:     decrease inflammation and decrease pain to improve patient's ability to progress to PLOF and address remaining functional goals.       min [] Estim Unattended,             type/location:       []  w/ice    []  w/heat        min [] Estim Attended,             type/location:       []  w/ice   []  w/heat         []  w/US   []  TENS insruct            min []  Mechanical Traction,        type/lbs:        []  pro      []  sup           []  int       []  cont            []  before manual           []  after manual     min []  Ultrasound,         settings/location:      min  unbilled []  Ice     []  Heat            location/position:    10     min [x]  Vasopneumatic Device,      press/temp: lo / 34   pre-treatment girth : 41.4 cm    post-treatment girth: 41.2 cm   measured at (landmark       location) : L Mid-patellar  If using vaso (only need to measure limb vaso being performed on)        min []  Other:      Skin assessment post-treatment (if applicable):    [x]  intact    []  redness- no adverse reaction                 []redness - adverse reaction:          [x]  Patient Education billed concurrently with other procedures   [x] Review HEP    [] Progressed/Changed HEP, detail:    [] Other detail:         Other Objective/Functional Measures    TTP lateral hamstring    Pain Level at end of session (0-10 scale): 1      Assessment   Pt tolerated treatment well. Pt tolerated additional glute med strengthening with continued fatigue on involved leg. Updaed HEP to add additional SL abd.         Patient will continue to benefit from skilled PT / OT services to modify and progress therapeutic interventions, analyze and address functional mobility deficits, analyze and address ROM deficits, analyze

## 2024-12-11 ENCOUNTER — APPOINTMENT (OUTPATIENT)
Facility: HOSPITAL | Age: 69
End: 2024-12-11
Payer: MEDICARE

## 2024-12-12 ENCOUNTER — HOSPITAL ENCOUNTER (OUTPATIENT)
Facility: HOSPITAL | Age: 69
Setting detail: RECURRING SERIES
Discharge: HOME | End: 2024-12-15
Payer: MEDICARE

## 2024-12-12 PROCEDURE — 97140 MANUAL THERAPY 1/> REGIONS: CPT | Performed by: PHYSICAL THERAPIST

## 2024-12-12 PROCEDURE — 97112 NEUROMUSCULAR REEDUCATION: CPT | Performed by: PHYSICAL THERAPIST

## 2024-12-12 PROCEDURE — 97016 VASOPNEUMATIC DEVICE THERAPY: CPT | Performed by: PHYSICAL THERAPIST

## 2024-12-12 PROCEDURE — 97110 THERAPEUTIC EXERCISES: CPT | Performed by: PHYSICAL THERAPIST

## 2024-12-12 NOTE — PROGRESS NOTES
session (0-10 scale): 1      Assessment   Pt tolerated treatment well.  Pt cont to tolerated current volume of LE mobility and HS mobility without c/o. No pain with HS exercises today with improving balance/SL stability today.       Patient will continue to benefit from skilled PT / OT services to modify and progress therapeutic interventions, analyze and address functional mobility deficits, analyze and address ROM deficits, analyze and address strength deficits, analyze and address soft tissue restrictions, analyze and cue for proper movement patterns, analyze and modify for postural abnormalities, analyze and address imbalance/dizziness, and instruct in home and community integration to address functional deficits and attain remaining goals.    Progress toward goals / Updated goals:  []  See Progress Note/Recertification    Continue to progress as tolerated      PLAN  Yes  Continue plan of care  Re-Cert Due: 10/14/24-01/12/25   [x]  Upgrade activities as tolerated  []  Discharge due to:  []  Other:      Johnathan Freeman, PT, DPT      12/12/2024       2:45 PM

## 2024-12-17 ENCOUNTER — HOSPITAL ENCOUNTER (OUTPATIENT)
Facility: HOSPITAL | Age: 69
Setting detail: RECURRING SERIES
Discharge: HOME | End: 2024-12-20
Payer: MEDICARE

## 2024-12-17 PROCEDURE — 97110 THERAPEUTIC EXERCISES: CPT | Performed by: PHYSICAL THERAPIST

## 2024-12-17 PROCEDURE — 97112 NEUROMUSCULAR REEDUCATION: CPT | Performed by: PHYSICAL THERAPIST

## 2024-12-17 PROCEDURE — 97140 MANUAL THERAPY 1/> REGIONS: CPT | Performed by: PHYSICAL THERAPIST

## 2024-12-17 NOTE — PROGRESS NOTES
(0-10 scale): 1      Assessment   Pt tolerated treatment well.  Pt tolerated additional volume of SL glute biased stability and endurance req mod a cuing to avoid lumbar compensations. Improving HS pain but remains challenged with glute endurance which is likely cause of lasting HS tendinosis.       Patient will continue to benefit from skilled PT / OT services to modify and progress therapeutic interventions, analyze and address functional mobility deficits, analyze and address ROM deficits, analyze and address strength deficits, analyze and address soft tissue restrictions, analyze and cue for proper movement patterns, analyze and modify for postural abnormalities, analyze and address imbalance/dizziness, and instruct in home and community integration to address functional deficits and attain remaining goals.    Progress toward goals / Updated goals:  []  See Progress Note/Recertification    Continue to progress as tolerated      PLAN  Yes  Continue plan of care  Re-Cert Due: 10/14/24-01/12/25   [x]  Upgrade activities as tolerated  []  Discharge due to:  []  Other:      Johnathan Freeman, PT, DPT      12/17/2024       2:35 PM

## 2024-12-19 ENCOUNTER — HOSPITAL ENCOUNTER (OUTPATIENT)
Facility: HOSPITAL | Age: 69
Setting detail: RECURRING SERIES
Discharge: HOME | End: 2024-12-22
Payer: MEDICARE

## 2024-12-19 PROCEDURE — 97140 MANUAL THERAPY 1/> REGIONS: CPT | Performed by: PHYSICAL THERAPIST

## 2024-12-19 PROCEDURE — 97112 NEUROMUSCULAR REEDUCATION: CPT | Performed by: PHYSICAL THERAPIST

## 2024-12-19 PROCEDURE — 97110 THERAPEUTIC EXERCISES: CPT | Performed by: PHYSICAL THERAPIST

## 2024-12-19 NOTE — PROGRESS NOTES
PHYSICAL THERAPY - MEDICARE DAILY TREATMENT NOTE (updated 3/23)      Date: 2024          Patient Name:  Kaycee Richards :  1955   Medical   Diagnosis:  Status post total left knee replacement [Z96.652] Treatment Diagnosis:  M25.562  LEFT KNEE PAIN and M79.652  Pain in left thigh  and M62.81  GENERAL MUSCLE WEAKNESS and R26.89   Abnormalities of gait and mobility    Referral Source:  Manuel Gregorio MD Insurance:   Payor: MEDICARE / Plan: MEDICARE PART A AND B / Product Type: *No Product type* /                     Patient  verified yes     Visit #   Current  / Total 16 24   Time   In / Out 1:45p 2:30p   Total Treatment Time 45   Total Timed Codes 45   1:1 Treatment Time 45      [facility] Totals Reminder:  bill using total billable   min of TIMED therapeutic procedures and modalities.   8-22 min = 1 unit; 23-37 min = 2 units; 38-52 min = 3 units; 53-67 min = 4 units; 68-82 min = 5 units            SUBJECTIVE    Pain Level (0-10 scale): 0    Any medication changes, allergies to medications, adverse drug reactions, diagnosis change, or new procedure performed?: [x] No    [] Yes (see summary sheet for update)  Medications: Verified on Patient Summary List    Subjective functional status/changes:       Pt cont to report improving HS soreness and tightness.         OBJECTIVE      Therapeutic Procedures:  Tx Min Billable or 1:1 Min (if diff from Tx Min) Procedure, Rationale, Specifics   15  21683 Therapeutic Exercise (timed):  increase ROM, strength, coordination, balance, and proprioception to improve patient's ability to progress to PLOF and address remaining functional goals. (see flow sheet as applicable)     Details if applicable:     15  00031 Neuromuscular Re-Education (timed):  improve balance, coordination, kinesthetic sense, posture, core stability and proprioception to improve patient's ability to develop conscious control of individual muscles and awareness of position of extremities in order to

## 2024-12-31 ENCOUNTER — HOSPITAL ENCOUNTER (OUTPATIENT)
Facility: HOSPITAL | Age: 69
Setting detail: RECURRING SERIES
Discharge: HOME | End: 2025-01-03
Payer: MEDICARE

## 2024-12-31 PROCEDURE — 97112 NEUROMUSCULAR REEDUCATION: CPT | Performed by: PHYSICAL THERAPIST

## 2024-12-31 PROCEDURE — 97110 THERAPEUTIC EXERCISES: CPT | Performed by: PHYSICAL THERAPIST

## 2024-12-31 PROCEDURE — 97140 MANUAL THERAPY 1/> REGIONS: CPT | Performed by: PHYSICAL THERAPIST

## 2024-12-31 NOTE — PROGRESS NOTES
PHYSICAL THERAPY - MEDICARE DAILY TREATMENT NOTE (updated 3/23)      Date: 2024          Patient Name:  Kaycee Richards :  1955   Medical   Diagnosis:  Status post total left knee replacement [Z96.652] Treatment Diagnosis:  M25.562  LEFT KNEE PAIN and M79.652  Pain in left thigh  and M62.81  GENERAL MUSCLE WEAKNESS and R26.89   Abnormalities of gait and mobility    Referral Source:  Manuel Gregorio MD Insurance:   Payor: MEDICARE / Plan: MEDICARE PART A AND B / Product Type: *No Product type* /                     Patient  verified yes     Visit #   Current  / Total 17 24   Time   In / Out 12:15p 1:00p   Total Treatment Time 45   Total Timed Codes 45   1:1 Treatment Time 45      [facility] Totals Reminder:  bill using total billable   min of TIMED therapeutic procedures and modalities.   8-22 min = 1 unit; 23-37 min = 2 units; 38-52 min = 3 units; 53-67 min = 4 units; 68-82 min = 5 units            SUBJECTIVE    Pain Level (0-10 scale): 0    Any medication changes, allergies to medications, adverse drug reactions, diagnosis change, or new procedure performed?: [x] No    [] Yes (see summary sheet for update)  Medications: Verified on Patient Summary List    Subjective functional status/changes:       Pt traveled over the last week and noticed and increase in stiffness in the knee and hamstring over that period. Noticing hamstring pain with sit <> stand and with bed mobility       OBJECTIVE      Therapeutic Procedures:  Tx Min Billable or 1:1 Min (if diff from Tx Min) Procedure, Rationale, Specifics   15  60328 Therapeutic Exercise (timed):  increase ROM, strength, coordination, balance, and proprioception to improve patient's ability to progress to PLOF and address remaining functional goals. (see flow sheet as applicable)     Details if applicable:     15  64189 Neuromuscular Re-Education (timed):  improve balance, coordination, kinesthetic sense, posture, core stability and proprioception to improve

## 2025-01-02 ENCOUNTER — HOSPITAL ENCOUNTER (OUTPATIENT)
Facility: HOSPITAL | Age: 70
Setting detail: RECURRING SERIES
Discharge: HOME | End: 2025-01-05
Payer: MEDICARE

## 2025-01-02 PROCEDURE — 97110 THERAPEUTIC EXERCISES: CPT | Performed by: PHYSICAL THERAPIST

## 2025-01-02 PROCEDURE — 97112 NEUROMUSCULAR REEDUCATION: CPT | Performed by: PHYSICAL THERAPIST

## 2025-01-02 NOTE — PROGRESS NOTES
Physical Therapy at [facility],   a part of [address], [address]  [place] [address]  Phone: [phone]  Fax: [phone]  PHYSICAL THERAPY PROGRESS NOTE  Patient Name:  Kaycee Richards :  1955   Treatment/Medical Diagnosis: Status post total left knee replacement [Z96.652]   Referral Source:  Manuel Gregorio MD     Date of Initial Visit:  10/14/24 Attended Visits:  18 Missed Visits:  0     SUMMARY OF TREATMENT/ASSESSMENT:  Pt is making good progress with PT. Pt is demonstrating knee AROM and PROM, decreased edema, improved gait improved LE strength, and decrease irritability/pain with functional actvities. Pt continued to c/o L posterior thigh soreness, worse in the morning and with movements of the trunk consistent with L5/S1 radiculopathy and increased neurual tension. Pt would benefit form continued skilled PT in order to progress upon these deficits and improve towards remaining functional goals     CURRENT STATUS/GOALS    Short Term Goals: To be accomplished in 16 treatments.  1) The patient will be independent with introductory HEP with no v.c.  MET   2) The patient will demonstrate knee flexion AROM to 120 or more deg to allow for donning and doffing of all LE clothing and shoe wear without assistance needed MET   3) The patient will demonstrate pain free knee extension A/PROM to improve gait mechanics when ambulating a minimum of 250 ft without AD progressing  4) The patient will transfer sit to stand without UE assistance to improve functional mobility in home setting.  MET  Long Term Goals: To be accomplished in 32 treatments.  1) The patient will demonstrate 5/5 BL LE strength to allow for ambulation up and down a minimum of 4 standard stairs progressing  2) The patient will subjectively report the ability to ambulate on uneven surfaces without fear of falling x 500 ft MET   3) The patient will demonstrate independence with 
progressing  2) The patient will subjectively report the ability to ambulate on uneven surfaces without fear of falling x 500 ft MET   3) The patient will demonstrate independence with finalized HEP without v.c. needed to allow for all transfers, ambulation x 500 ft and all in home functional mobility MET  4) The patient will negotiate 12 steps reciprocally without an increase in pain from baseline level or without reported fear of falling and without UE assistance on railing to improve safety in the home.   MET   5) Pt will be able to ambulate community distances without pain for 30 mins NEW      PLAN  Yes  Continue plan of care  Re-Cert Due: 10/14/24-01/12/25   [x]  Upgrade activities as tolerated  []  Discharge due to:  []  Other:      Johnathan Freeman, PT, DPT      1/2/2025       2:39 PM

## 2025-01-06 ENCOUNTER — HOSPITAL ENCOUNTER (OUTPATIENT)
Facility: HOSPITAL | Age: 70
Setting detail: RECURRING SERIES
End: 2025-01-06
Payer: MEDICARE

## 2025-01-08 ENCOUNTER — HOSPITAL ENCOUNTER (OUTPATIENT)
Facility: HOSPITAL | Age: 70
Setting detail: RECURRING SERIES
Discharge: HOME | End: 2025-01-11
Payer: MEDICARE

## 2025-01-08 PROCEDURE — 97110 THERAPEUTIC EXERCISES: CPT

## 2025-01-08 NOTE — PROGRESS NOTES
progress to PLOF and address remaining functional goals.       min [] Estim Unattended,             type/location:       []  w/ice    []  w/heat        min [] Estim Attended,             type/location:       []  w/ice   []  w/heat         []  w/US   []  TENS insruct            min []  Mechanical Traction,        type/lbs:        []  pro      []  sup           []  int       []  cont            []  before manual           []  after manual     min []  Ultrasound,         settings/location:     10 min  unbilled []  Ice     [x]  Heat            location/position: Back         min []  Vasopneumatic Device,      press/temp:   pre-treatment girth :    post-treatment girth :    measured at (landmark       location) :   If using vaso (only need to measure limb vaso being performed on)        min []  Other:      Skin assessment post-treatment (if applicable):    [x]  intact    []  redness- no adverse reaction                 []redness - adverse reaction:          [x]  Patient Education billed concurrently with other procedures   [x] Review HEP    [] Progressed/Changed HEP, detail:    [] Other detail:         Other Objective/Functional Measures      Pain Level at end of session (0-10 scale): 0      Assessment     Patient will continue to benefit from skilled PT / OT services to modify and progress therapeutic interventions, analyze and address functional mobility deficits, analyze and address ROM deficits, analyze and address strength deficits, analyze and address soft tissue restrictions, analyze and cue for proper movement patterns, analyze and modify for postural abnormalities, analyze and address imbalance/dizziness, and instruct in home and community integration to address functional deficits and attain remaining goals.    Progress toward goals / Updated goals:  []  See Progress Note/Recertification    Patient tolerated exercises well.  Patient has increased weakness and fatigue in LLE compared to RLE.      PLAN  YES

## 2025-01-22 ENCOUNTER — HOSPITAL ENCOUNTER (OUTPATIENT)
Facility: HOSPITAL | Age: 70
Setting detail: RECURRING SERIES
Discharge: HOME | End: 2025-01-25
Payer: MEDICARE

## 2025-01-22 PROCEDURE — 97110 THERAPEUTIC EXERCISES: CPT | Performed by: PHYSICAL THERAPIST

## 2025-01-22 NOTE — PROGRESS NOTES
PHYSICAL THERAPY - MEDICARE DAILY TREATMENT NOTE (updated 3/23)      Date: 2025          Patient Name:  Kaycee Richards :  1955   Medical   Diagnosis:  Status post total left knee replacement [Z96.652] Treatment Diagnosis:  M25.562  LEFT KNEE PAIN and M79.652  Pain in left thigh  and M62.81  GENERAL MUSCLE WEAKNESS and R26.89   Abnormalities of gait and mobility    Referral Source:  Manuel Gregorio MD Insurance:   Payor: MEDICARE / Plan: MEDICARE PART A AND B / Product Type: *No Product type* /                     Patient  verified YES    Visit #   Current  / Total 20 24   Time   In / Out 1:45p 2:30p   Total Treatment Time 60   Total Timed Codes 45   1:1 Treatment Time 45      [facility] BC Totals Reminder:  bill using total billable   min of TIMED therapeutic procedures and modalities.   8-22 min = 1 unit; 23-37 min = 2 units; 38-52 min = 3 units; 53-67 min = 4 units; 68-82 min = 5 units            SUBJECTIVE    Pain Level (0-10 scale): 0     Any medication changes, allergies to medications, adverse drug reactions, diagnosis change, or new procedure performed?: [x] No    [] Yes (see summary sheet for update)  Medications: Verified on Patient Summary List    Subjective functional status/changes:     Pt reporting improving L sided radicular symptoms. Noting soreness with L gastroc stretch which improves with sciatic nerve gliding      OBJECTIVE    Therapeutic Procedures:  Tx Min Billable or 1:1 Min (if diff from Tx Min) Procedure, Rationale, Specifics   45  96098 Therapeutic Exercise (timed):  increase ROM, strength, coordination, balance, and proprioception to improve patient's ability to progress to PLOF and address remaining functional goals. (see flow sheet as applicable)     Details if applicable:            Details if applicable:     45     Total Total         Modalities Rationale:     decrease pain and increase tissue extensibility to improve patient's ability to progress to PLOF and address remaining

## 2025-01-24 ENCOUNTER — HOSPITAL ENCOUNTER (OUTPATIENT)
Facility: HOSPITAL | Age: 70
Setting detail: RECURRING SERIES
Discharge: HOME | End: 2025-01-27
Payer: MEDICARE

## 2025-01-24 PROCEDURE — 97110 THERAPEUTIC EXERCISES: CPT | Performed by: PHYSICAL THERAPIST

## 2025-01-24 NOTE — PROGRESS NOTES
PHYSICAL THERAPY - MEDICARE DAILY TREATMENT NOTE (updated 3/23)      Date: 2025          Patient Name:  Kaycee Richards :  1955   Medical   Diagnosis:  Status post total left knee replacement [Z96.652] Treatment Diagnosis:  M25.562  LEFT KNEE PAIN and M79.652  Pain in left thigh  and M62.81  GENERAL MUSCLE WEAKNESS and R26.89   Abnormalities of gait and mobility    Referral Source:  Manuel Gregorio MD Insurance:   Payor: MEDICARE / Plan: MEDICARE PART A AND B / Product Type: *No Product type* /                     Patient  verified YES    Visit #   Current  / Total 21 24   Time   In / Out 10:15a 11:00a   Total Treatment Time 60   Total Timed Codes 45   1:1 Treatment Time 45      MC BC Totals Reminder:  bill using total billable   min of TIMED therapeutic procedures and modalities.   8-22 min = 1 unit; 23-37 min = 2 units; 38-52 min = 3 units; 53-67 min = 4 units; 68-82 min = 5 units            SUBJECTIVE    Pain Level (0-10 scale): 0     Any medication changes, allergies to medications, adverse drug reactions, diagnosis change, or new procedure performed?: [x] No    [] Yes (see summary sheet for update)  Medications: Verified on Patient Summary List    Subjective functional status/changes:     Improving L sided leg symptoms      OBJECTIVE    Therapeutic Procedures:  Tx Min Billable or 1:1 Min (if diff from Tx Min) Procedure, Rationale, Specifics   45  71214 Therapeutic Exercise (timed):  increase ROM, strength, coordination, balance, and proprioception to improve patient's ability to progress to PLOF and address remaining functional goals. (see flow sheet as applicable)     Details if applicable:            Details if applicable:     45     Total Total         Modalities Rationale:     decrease pain and increase tissue extensibility to improve patient's ability to progress to PLOF and address remaining functional goals.       min [] Estim Unattended,             type/location:       []  w/ice    []

## 2025-01-27 ENCOUNTER — HOSPITAL ENCOUNTER (OUTPATIENT)
Facility: HOSPITAL | Age: 70
Setting detail: RECURRING SERIES
Discharge: HOME | End: 2025-01-30
Payer: MEDICARE

## 2025-01-27 PROCEDURE — 97110 THERAPEUTIC EXERCISES: CPT

## 2025-01-27 NOTE — PROGRESS NOTES
PHYSICAL THERAPY - MEDICARE DAILY TREATMENT NOTE (updated 3/23)      Date: 2025          Patient Name:  Kaycee Richards :  1955   Medical   Diagnosis:  Status post total left knee replacement [Z96.652] Treatment Diagnosis:  M25.562  LEFT KNEE PAIN and M79.652  Pain in left thigh  and M62.81  GENERAL MUSCLE WEAKNESS and R26.89   Abnormalities of gait and mobility    Referral Source:  Manuel Gregorio MD Insurance:   Payor: MEDICARE / Plan: MEDICARE PART A AND B / Product Type: *No Product type* /                     Patient  verified YES    Visit #   Current  / Total 22 44   Time   In / Out 1:30p 2:15p   Total Treatment Time 45   Total Timed Codes 45   1:1 Treatment Time 45      [facility] Totals Reminder:  bill using total billable   min of TIMED therapeutic procedures and modalities.   8-22 min = 1 unit; 23-37 min = 2 units; 38-52 min = 3 units; 53-67 min = 4 units; 68-82 min = 5 units            SUBJECTIVE    Pain Level (0-10 scale): 0     Any medication changes, allergies to medications, adverse drug reactions, diagnosis change, or new procedure performed?: [x] No    [] Yes (see summary sheet for update)  Medications: Verified on Patient Summary List    Subjective functional status/changes:     Feeling a little stiff today.  Already did 1 hour of water aerobics this morning.        OBJECTIVE    Therapeutic Procedures:  Tx Min Billable or 1:1 Min (if diff from Tx Min) Procedure, Rationale, Specifics   45  50946 Therapeutic Exercise (timed):  increase ROM, strength, coordination, balance, and proprioception to improve patient's ability to progress to PLOF and address remaining functional goals. (see flow sheet as applicable)     Details if applicable:     45     Total Total             [x]  Patient Education billed concurrently with other procedures   [x] Review HEP    [] Progressed/Changed HEP, detail:    [] Other detail:         Other Objective/Functional Measures    Heel raises not tolerated today due to  Patient/Caregiver provided printed discharge information.

## 2025-01-29 ENCOUNTER — HOSPITAL ENCOUNTER (OUTPATIENT)
Facility: HOSPITAL | Age: 70
Setting detail: RECURRING SERIES
Discharge: HOME | End: 2025-02-01
Payer: MEDICARE

## 2025-01-29 PROCEDURE — 97110 THERAPEUTIC EXERCISES: CPT

## 2025-01-29 PROCEDURE — 97112 NEUROMUSCULAR REEDUCATION: CPT

## 2025-01-29 NOTE — PROGRESS NOTES
PHYSICAL THERAPY - MEDICARE DAILY TREATMENT NOTE (updated 3/23)      Date: 2025          Patient Name:  Kaycee Richards :  1955   Medical   Diagnosis:  Status post total left knee replacement [Z96.652] Treatment Diagnosis:  M25.562  LEFT KNEE PAIN and M79.652  Pain in left thigh  and M62.81  GENERAL MUSCLE WEAKNESS and R26.89   Abnormalities of gait and mobility    Referral Source:  Manuel Gregorio MD Insurance:   Payor: MEDICARE / Plan: MEDICARE PART A AND B / Product Type: *No Product type* /                     Patient  verified YES    Visit #   Current  / Total 23 24   Time   In / Out 2:05pm 2:55pm   Total Treatment Time 50   Total Timed Codes 50   1:1 Treatment Time 50      [facility] Totals Reminder:  bill using total billable   min of TIMED therapeutic procedures and modalities.   8-22 min = 1 unit; 23-37 min = 2 units; 38-52 min = 3 units; 53-67 min = 4 units; 68-82 min = 5 units        2TE, 1NM    SUBJECTIVE    Pain Level (0-10 scale): 0/10    Any medication changes, allergies to medications, adverse drug reactions, diagnosis change, or new procedure performed?: [x] No    [] Yes (see summary sheet for update)  Medications: Verified on Patient Summary List    Subjective functional status/changes:     Patient reported feeling some tugging in posterior L Knee.  Patient stated she felt a pull when doing standing marches at balance class this morning.  Patient reported that back stretches have helped reduce pain in posterior L knee.    OBJECTIVE      Therapeutic Procedures:  Tx Min Billable or 1:1 Min (if diff from Tx Min) Procedure, Rationale, Specifics   40  81477 Therapeutic Exercise (timed):  increase ROM, strength, coordination, balance, and proprioception to improve patient's ability to progress to PLOF and address remaining functional goals. (see flow sheet as applicable)     Details if applicable:     10  25314 Neuromuscular Re-Education (timed):  improve balance, coordination, kinesthetic

## 2025-02-03 ENCOUNTER — HOSPITAL ENCOUNTER (OUTPATIENT)
Facility: HOSPITAL | Age: 70
Setting detail: RECURRING SERIES
Discharge: HOME | End: 2025-02-06
Payer: MEDICARE

## 2025-02-03 PROCEDURE — 97112 NEUROMUSCULAR REEDUCATION: CPT

## 2025-02-03 PROCEDURE — 97110 THERAPEUTIC EXERCISES: CPT

## 2025-02-03 NOTE — PROGRESS NOTES
PHYSICAL THERAPY - MEDICARE DAILY TREATMENT NOTE (updated 3/23)      Date: 2/3/2025          Patient Name:  Kaycee Richards :  1955   Medical   Diagnosis:  Status post total left knee replacement [Z96.652] Treatment Diagnosis:  M25.562  LEFT KNEE PAIN and M79.652  Pain in left thigh  and M62.81  GENERAL MUSCLE WEAKNESS and R26.89   Abnormalities of gait and mobility    Referral Source:  Manuel Gregorio MD Insurance:   Payor: MEDICARE / Plan: MEDICARE PART A AND B / Product Type: *No Product type* /                     Patient  verified YES    Visit #   Current  / Total 24 48   Time   In / Out 4:20 5:05   Total Treatment Time 45   Total Timed Codes 45   1:1 Treatment Time 45       BC Totals Reminder:  bill using total billable   min of TIMED therapeutic procedures and modalities.   8-22 min = 1 unit; 23-37 min = 2 units; 38-52 min = 3 units; 53-67 min = 4 units; 68-82 min = 5 units            SUBJECTIVE    Pain Level (0-10 scale): 0/10    Any medication changes, allergies to medications, adverse drug reactions, diagnosis change, or new procedure performed?: [x] No    [] Yes (see summary sheet for update)  Medications: Verified on Patient Summary List    Subjective functional status/changes:     Patient reported that the pulling in her L posterior knee has decreased.  It is no longer a stabbing pain but a mild pull.  Patient stated she went on a long walk this morning and went to her water aerobics class before her PT session.     OBJECTIVE      Therapeutic Procedures:  Tx Min Billable or 1:1 Min (if diff from Tx Min) Procedure, Rationale, Specifics   45  88580 Therapeutic Exercise (timed):  increase ROM, strength, coordination, balance, and proprioception to improve patient's ability to progress to PLOF and address remaining functional goals. (see flow sheet as applicable)     Details if applicable:     45     Total Total           [x]  Patient Education billed concurrently with other procedures   [x]

## 2025-02-11 ENCOUNTER — HOSPITAL ENCOUNTER (OUTPATIENT)
Facility: HOSPITAL | Age: 70
Setting detail: RECURRING SERIES
Discharge: HOME | End: 2025-02-14
Payer: MEDICARE

## 2025-02-11 PROCEDURE — 97110 THERAPEUTIC EXERCISES: CPT

## 2025-02-11 NOTE — PROGRESS NOTES
procedures   [x] Review HEP    [] Progressed/Changed HEP, detail:    [] Other detail:         Other Objective/Functional Measures  Patient tolerated exercises well.  Patient felt some \"tingling\" in back of L knee after TKE.      Patient had 3-4 handheld touches during 30 sec Tandem Balance, heel to toe.    Pain Level at end of session (0-10 scale): 0/10      Assessment     Patient will continue to benefit from skilled PT / OT services to modify and progress therapeutic interventions, analyze and address functional mobility deficits, analyze and address ROM deficits, analyze and address strength deficits, analyze and address soft tissue restrictions, analyze and cue for proper movement patterns, analyze and modify for postural abnormalities, analyze and address imbalance/dizziness, and instruct in home and community integration to address functional deficits and attain remaining goals.    Progress toward goals / Updated goals:  []  See Progress Note/Recertification    Updated HEP.  Patient progressing well towards goals      PLAN  YES Continue plan of care  Re-Cert Due: 4/10/25  [x]  Upgrade activities as tolerated  []  Discharge due to :  []  Other:      ALBA Morales       2/11/2025       11:18 AM

## 2025-02-14 NOTE — PROGRESS NOTES
Kaycee Richards is a 69 y.o. female returns for an annual exam         Patient's last menstrual period was 09/22/2004.  Problems: no problems  Birth Control: post menopausal status.  Last Pap: normal obtained 8/30/22.  She does not have a history of ROMI 2, 3 or cervical cancer.   Last Mammogram: had her mammogram today in our office. .   Last Bone Density: abnormal obtained 1 year(s) ago.  Last colonoscopy: normal obtained 5 year(s) ago.          Examination chaperoned by PAUL ROBLES RN.

## 2025-02-18 ENCOUNTER — HOSPITAL ENCOUNTER (OUTPATIENT)
Facility: HOSPITAL | Age: 70
Setting detail: RECURRING SERIES
Discharge: HOME | End: 2025-02-21
Payer: MEDICARE

## 2025-02-18 ENCOUNTER — TELEPHONE (OUTPATIENT)
Age: 70
End: 2025-02-18

## 2025-02-18 ENCOUNTER — OFFICE VISIT (OUTPATIENT)
Age: 70
End: 2025-02-18
Payer: MEDICARE

## 2025-02-18 VITALS
BODY MASS INDEX: 28.24 KG/M2 | OXYGEN SATURATION: 96 % | WEIGHT: 159.4 LBS | SYSTOLIC BLOOD PRESSURE: 153 MMHG | DIASTOLIC BLOOD PRESSURE: 71 MMHG | HEIGHT: 63 IN | RESPIRATION RATE: 16 BRPM | HEART RATE: 82 BPM | TEMPERATURE: 98.6 F

## 2025-02-18 DIAGNOSIS — N95.1 POST MENOPAUSAL SYNDROME: ICD-10-CM

## 2025-02-18 DIAGNOSIS — Z01.419 ENCOUNTER FOR WELL WOMAN EXAM WITH ROUTINE GYNECOLOGICAL EXAM: Primary | ICD-10-CM

## 2025-02-18 PROCEDURE — G0101 CA SCREEN;PELVIC/BREAST EXAM: HCPCS | Performed by: OBSTETRICS & GYNECOLOGY

## 2025-02-18 PROCEDURE — 1160F RVW MEDS BY RX/DR IN RCRD: CPT | Performed by: OBSTETRICS & GYNECOLOGY

## 2025-02-18 PROCEDURE — 1126F AMNT PAIN NOTED NONE PRSNT: CPT | Performed by: OBSTETRICS & GYNECOLOGY

## 2025-02-18 PROCEDURE — 97110 THERAPEUTIC EXERCISES: CPT

## 2025-02-18 PROCEDURE — 1159F MED LIST DOCD IN RCRD: CPT | Performed by: OBSTETRICS & GYNECOLOGY

## 2025-02-18 RX ORDER — ASPIRIN 325 MG
325 TABLET ORAL DAILY
COMMUNITY

## 2025-02-18 RX ORDER — ESTRADIOL AND NORETHINDRONE ACETATE 1; .5 MG/1; MG/1
1 TABLET ORAL DAILY
Qty: 90 TABLET | Refills: 4 | Status: SHIPPED | OUTPATIENT
Start: 2025-02-18

## 2025-02-18 NOTE — TELEPHONE ENCOUNTER
[facility] pharmacy was called back and verbal orders given for the EC-RX Testosterone 0.2 % CREA  to be 2% and still only 0.5 g on to the skin daily. Dispense 30 g and refills of 5 as per Dr. Ann Amezquita    Pharmacist verbalized understanding and will speak to the patient.

## 2025-02-18 NOTE — PROGRESS NOTES
Annual exam    Kaycee Richards is a ,  69 y.o. female   Patient's last menstrual period was 2004.    She presents for her annual checkup.     She has no significant complaints     Hormonal status:  She reports no perimenstrual type symptoms.   She is not having vasomotor symptoms.  The patient is  using ERT.     Sexual history:    She  reports being sexually active and has had partner(s) who are male.    Per Nursing Note:   Patient's last menstrual period was 2004.  Problems: no problems  Birth Control: post menopausal status.  Last Pap: normal obtained 22.  She does not have a history of ROMI 2, 3 or cervical cancer.   Last Mammogram: had her mammogram today in our office. .   Last Bone Density: abnormal obtained 1 year(s) ago.  Last colonoscopy: normal obtained 5 year(s) ago.    Past Surgical History:   Procedure Laterality Date    HAND SURGERY  2010    KNEE ARTHROSCOPY      KNEE SURGERY         Current Outpatient Medications   Medication Sig Dispense Refill    aspirin 325 MG tablet Take 1 tablet by mouth daily      estradiol-norethindrone (ACTIVELLA) 1-0.5 MG per tablet Take 1 tablet by mouth daily 90 tablet 4    EC-RX Testosterone 0.2 % CREA Place 1 g onto the skin daily for 30 days. Max Daily Amount: 1 g 30 g 5    meloxicam (MOBIC) 15 MG tablet Take 1 tablet by mouth daily (Patient not taking: Reported on 2025) 30 tablet 0     No current facility-administered medications for this visit.     Allergies: [place] flavoring agent (non-screening)     Tobacco History:  reports that she has never smoked. She has never used smokeless tobacco.  Alcohol Abuse:  reports current alcohol use of about 4.0 standard drinks of alcohol per week.  Drug Abuse:  reports that she does not currently use drugs after having used the following drugs: Marijuana ([place]).    Family Medical/Cancer History:   Family History   Problem Relation Age of Onset    Asthma Mother     Hypertension Mother     Heart

## 2025-02-18 NOTE — TELEPHONE ENCOUNTER
Two patient identifiers used    69 year old patient last seen in the office today for ae.    [facility] calling to ask for clarification of the medication sent today for EC-RX Testosterone 0.2 % CREA [[id number]]    Order Details  Dose: 0.5 g Route: TransDERmal Frequency: DAILY   Dispense Quantity: 30 g Refills: 5          Sig: Place 0.5 g onto the skin daily for 30 days. Max Daily Amount: 0.5 g         Start Date: 02/18/25 End Date: 03/20/25 after 30 doses   Written Date: 02/18/25 Expiration Date: 08/17/25       Associated Diagnoses: Post menopausal syndrome [N95.1]   Original Order: EC-RX Testosterone 0.2 % CREA [[id number]]       [facility] pharmacy states the patient has a history of 2% cream and using whole gram of the cream instead of the .5g  Pharmacy saying the .02 % and only 0.5 gram is a big change.    Please advise       Thank you

## 2025-02-18 NOTE — PROGRESS NOTES
PHYSICAL THERAPY - MEDICARE DAILY TREATMENT NOTE (updated 3/23)      Date: 2025          Patient Name:  Kaycee Richards :  1955   Medical   Diagnosis:  Status post total left knee replacement [Z96.652] Treatment Diagnosis:  M25.562  LEFT KNEE PAIN and M79.652  Pain in left thigh  and M62.81  GENERAL MUSCLE WEAKNESS and R26.89   Abnormalities of gait and mobility    Referral Source:  Manuel Gregorio MD Insurance:   Payor: MEDICARE / Plan: MEDICARE PART A AND B / Product Type: *No Product type* /                     Patient  verified YES    Visit #   Current  / Total 26 48   Time   In / Out 11:15am 12:00am   Total Treatment Time 45   Total Timed Codes 45   1:1 Treatment Time 45      [facility] BC Totals Reminder:  bill using total billable   min of TIMED therapeutic procedures and modalities.   8-22 min = 1 unit; 23-37 min = 2 units; 38-52 min = 3 units; 53-67 min = 4 units; 68-82 min = 5 units            SUBJECTIVE    Pain Level (0-10 scale): 0/10    Any medication changes, allergies to medications, adverse drug reactions, diagnosis change, or new procedure performed?: [x] No    [] Yes (see summary sheet for update)  Medications: Verified on Patient Summary List    Subjective functional status/changes:     No longer experiencing pain/pulling behind her knee. Some numbness in the front of knee lingers.  Has been religiously practicing squats after getting \"stuck\" the other week.   Able to participate in line dancing without pain or difficulty.    OBJECTIVE      Therapeutic Procedures:  Tx Min Billable or 1:1 Min (if diff from Tx Min) Procedure, Rationale, Specifics   45  33570 Therapeutic Exercise (timed):  increase ROM, strength, coordination, balance, and proprioception to improve patient's ability to progress to PLOF and address remaining functional goals. (see flow sheet as applicable)     Details if applicable:     45     Total Total         [x]  Patient Education billed concurrently with other procedures

## 2025-02-19 ENCOUNTER — HOSPITAL ENCOUNTER (OUTPATIENT)
Facility: HOSPITAL | Age: 70
Setting detail: RECURRING SERIES
End: 2025-02-19
Payer: MEDICARE

## 2025-02-26 ENCOUNTER — HOSPITAL ENCOUNTER (OUTPATIENT)
Facility: HOSPITAL | Age: 70
Setting detail: RECURRING SERIES
Discharge: HOME | End: 2025-03-01
Payer: MEDICARE

## 2025-02-26 PROCEDURE — 97110 THERAPEUTIC EXERCISES: CPT

## 2025-02-26 NOTE — PROGRESS NOTES
Physical Therapy at [place],   a part of [facility]  [address], [address]  [place] [id number]  Phone: [phone]  Fax: [phone]  DISCHARGE SUMMARY  Patient Name: Kaycee Richards : 1955   Treatment/Medical Diagnosis: Status post total left knee replacement [Z96.652]   Referral Source: Manuel Gregorio MD     Date of Initial Visit: 10/14/24 Attended Visits: 27 Missed Visits: 1     SUMMARY OF TREATMENT  30802 Therapeutic Exercise, 50699 Neuromuscular Re-Education, 52472 Manual Therapy, 88364 Therapeutic Activity, 85933 Self Care/Home Management, 03522 Electrical Stim unattended /  (MCR), 57948 Vasopneumatic Device  (Vasopnuematic compression justification:  Per bilateral girth measures taken and listed above the edema is considered significant and having an impact on the patient's strength, balance, gait, transfers, self care, and ADL's), and 20464 Gait Training     CURRENT STATUS  Patient has met all goals at this time    Pt will demo independence with HEP at the time of D/C as well as LE and UE AROM WFL to allow for continued strength and endurance progression for pain free participation in ADL's and community outings.   Pt will demo 48/56 or higher on RAMOS balance test to indicate decreased risk of falls  Pt will demo amb 30 min with no pain and no LOB at the time of D/C  Pt will negotiate 12 steps reciprocally without subjectively reported fear of falling or demonstrated LOB to improve safety in mobility in the home.       RECOMMENDATIONS  Discontinue therapy. Progressing towards or have reached established goals.        Fidencio Penn, PT       2025       2:52 PM    If you have any questions/comments please contact us directly at [phone].   Thank you for allowing us to assist in the care of your patient.

## 2025-02-26 NOTE — PROGRESS NOTES
PHYSICAL THERAPY - MEDICARE DAILY TREATMENT NOTE (updated 3/23)      Date: 2025          Patient Name:  Kaycee Richards :  1955   Medical   Diagnosis:  Status post total left knee replacement [Z96.652] Treatment Diagnosis:  M25.562  LEFT KNEE PAIN and M79.652  Pain in left thigh  and M62.81  GENERAL MUSCLE WEAKNESS and R26.89   Abnormalities of gait and mobility    Referral Source:  Manuel Gregorio MD Insurance:   Payor: MEDICARE / Plan: MEDICARE PART A AND B / Product Type: *No Product type* /                     Patient  verified YES    Visit #   Current  / Total 27 48   Time   In / Out 215pm 300pm   Total Treatment Time 45   Total Timed Codes 45   1:1 Treatment Time 45      [facility] Totals Reminder:  bill using total billable   min of TIMED therapeutic procedures and modalities.   8-22 min = 1 unit; 23-37 min = 2 units; 38-52 min = 3 units; 53-67 min = 4 units; 68-82 min = 5 units            SUBJECTIVE    Pain Level (0-10 scale): 0/10    Any medication changes, allergies to medications, adverse drug reactions, diagnosis change, or new procedure performed?: [x] No    [] Yes (see summary sheet for update)  Medications: Verified on Patient Summary List    Subjective functional status/changes:     Continues to do well.  Has not had pain in the back of her leg.    OBJECTIVE      Therapeutic Procedures:  Tx Min Billable or 1:1 Min (if diff from Tx Min) Procedure, Rationale, Specifics   45  24239 Therapeutic Exercise (timed):  increase ROM, strength, coordination, balance, and proprioception to improve patient's ability to progress to PLOF and address remaining functional goals. (see flow sheet as applicable)     Details if applicable:     45     Total Total         [x]  Patient Education billed concurrently with other procedures   [x] Review HEP    [] Progressed/Changed HEP, detail:    [] Other detail:         Other Objective/Functional Measures  Right knee joint line: 38cm  Left knee joint line: